# Patient Record
Sex: MALE | Race: WHITE | NOT HISPANIC OR LATINO | Employment: OTHER | URBAN - METROPOLITAN AREA
[De-identification: names, ages, dates, MRNs, and addresses within clinical notes are randomized per-mention and may not be internally consistent; named-entity substitution may affect disease eponyms.]

---

## 2018-11-07 ENCOUNTER — TELEPHONE (OUTPATIENT)
Dept: NEUROLOGY | Facility: CLINIC | Age: 63
End: 2018-11-07

## 2018-11-09 ENCOUNTER — TELEPHONE (OUTPATIENT)
Dept: NEUROLOGY | Facility: CLINIC | Age: 63
End: 2018-11-09

## 2018-12-03 ENCOUNTER — OFFICE VISIT (OUTPATIENT)
Dept: NEUROLOGY | Facility: CLINIC | Age: 63
End: 2018-12-03

## 2018-12-03 VITALS
HEIGHT: 71 IN | SYSTOLIC BLOOD PRESSURE: 158 MMHG | BODY MASS INDEX: 21.98 KG/M2 | WEIGHT: 157 LBS | HEART RATE: 97 BPM | DIASTOLIC BLOOD PRESSURE: 80 MMHG

## 2018-12-03 DIAGNOSIS — G44.40 MEDICATION OVERUSE HEADACHE: ICD-10-CM

## 2018-12-03 DIAGNOSIS — F11.20 CHRONIC NARCOTIC DEPENDENCE (HCC): ICD-10-CM

## 2018-12-03 DIAGNOSIS — G43.709 CHRONIC MIGRAINE WITHOUT AURA WITHOUT STATUS MIGRAINOSUS, NOT INTRACTABLE: ICD-10-CM

## 2018-12-03 DIAGNOSIS — R51.9 CHRONIC DAILY HEADACHE: Primary | ICD-10-CM

## 2018-12-03 DIAGNOSIS — R45.89 DEPRESSED MOOD: ICD-10-CM

## 2018-12-03 PROCEDURE — 99243 OFF/OP CNSLTJ NEW/EST LOW 30: CPT | Performed by: PSYCHIATRY & NEUROLOGY

## 2018-12-03 RX ORDER — HYDROCODONE BITARTRATE AND ACETAMINOPHEN 5; 325 MG/1; MG/1
1 TABLET ORAL EVERY 4 HOURS PRN
COMMUNITY
End: 2019-02-21 | Stop reason: SDUPTHER

## 2018-12-03 RX ORDER — HYDROCODONE BITARTRATE AND ACETAMINOPHEN 10; 325 MG/1; MG/1
1 TABLET ORAL EVERY 4 HOURS PRN
COMMUNITY
End: 2019-08-30 | Stop reason: HOSPADM

## 2018-12-03 RX ORDER — PROCHLORPERAZINE MALEATE 10 MG
10 TABLET ORAL 3 TIMES DAILY
Refills: 0 | COMMUNITY
Start: 2018-10-29 | End: 2019-04-24 | Stop reason: ALTCHOICE

## 2018-12-03 RX ORDER — FENTANYL 50 UG/H
1 PATCH TRANSDERMAL
COMMUNITY
End: 2019-08-30 | Stop reason: HOSPADM

## 2018-12-03 RX ORDER — VENLAFAXINE HYDROCHLORIDE 37.5 MG/1
CAPSULE, EXTENDED RELEASE ORAL
Qty: 60 CAPSULE | Refills: 3 | Status: SHIPPED | OUTPATIENT
Start: 2018-12-03 | End: 2019-04-16 | Stop reason: SDUPTHER

## 2018-12-03 NOTE — PATIENT INSTRUCTIONS
Would recommend considering cognitive behavioral therapy for chronic pain  If you could please try and send or bring in the MRI brain results, as well as recent lab results, and notes from your other doctor of what medications you have tried for pain prevention in the past (Lyrica etc)     Headache/migraine treatment:   Abortive medications (for immediate treatment of a headache): It is ok to take ibuprofen, acetaminophen or naproxen (Advil, Tylenol,  Aleve, Excedrin) if they help your headaches you should limit these to No more than 3 times a week to avoid medication overuse/rebound headaches  - continue to try in gradually wean off daily narcotics    Over the counter preventive supplements for headaches/migraines   (to take every day to help prevent headaches - not to take at the time of headache):  [x] Magnesium 500mg daily   [x] Riboflavin (Vitamin B2)  400mg daily (adults)   (FYI B2 may make your urine bright/neon yellow)  AND/OR  [x] Herbal medication: Petasites/[id number] mg (adults) daily  (When choosing your Butterbur online or in the store, beware that there are some poor preps containing pyrrolizidine alkaloids (PAs) that can be harmful to the liver  Therefore, do not use butterbur products that are not certified and labeled as hepatotoxic PA-free )    Prescription preventive medications for headaches/migraines   (to take every day to help prevent headaches - not to take at the time of headache):  [x] Venlafaxine 37 5 mg for 1 week and then increase to 75 mg daily   *Typically these types of medications take time untill you see the benefit, although some may see improvement in days, often it may take weeks, especially if the medication is being titrated up to a beneficial level  Please contact us if there are any concerns or questions regarding the medication  Sleep/headache prevention:   [x] Melatonin -  take 3 mg nightly for sleep   You should take this 1 hour prior to bedtime consistently every night for it to work  It works by gradually helping to adjust your sleep time over days to weeks, rather than immediately making you feel sleepy  Lifestyle Recommendations:  [x] SLEEP - Maintain a regular sleep schedule: Adults need at least 7-8 hours of uninterrupted a night  Maintain good sleep hygiene:  Going to bed and waking up at consistent times, avoiding excessive daytime naps, avoiding caffeinated beverages in the evening, avoid excessive stimulation in the evening and generally using bed primarily for sleeping  One hour before bedtime would recommend turning lights down lower, decreasing your activity (may read quietly, listen to music at a low volume)  When you get into bed, should eliminate all technology (no texting, emailing, playing with your phone, iPad or tablet in bed)  [x] HYDRATION - Maintain good hydration  Drink  2L of fluid a day (4 typical small water bottles)  [x] DIET - Maintain good nutrition  In particular don't skip meals and try and eat healthy balanced meals regularly  [x] TRIGGERS - Look for other triggers and avoid them: Limit caffeine to 1-2 cups a day or less  Avoid dietary triggers that you have noticed bring on your headaches (this could include aged cheese, peanuts, MSG, aspartame and nitrates)  [x] EXERCISE - physical exercise as we all know is good for you in many ways, and not only is good for your heart, but also is beneficial for your mental health, cognitive health and  chronic pain/headaches  I would encourage at the least 5 days of physical exercise weekly for at least 30 minutes  Education and Follow-up  [x] Please call with any questions or concerns     [x] Follow up with Dr Tai Rey
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
## 2018-12-03 NOTE — PROGRESS NOTES
Patient ID: Juan Ferrsi is a 66 y o  male  Assessment/Plan:    No problem-specific Assessment & Plan notes found for this encounter  {Assess/PlanSmartLinks:49413}       Subjective:    HPI    {[facility] HPI texts:93769}    {Common ambulatory SmartLinks:12318}         Objective:    Height 5' 11" (1 803 m), weight 71 2 kg (157 lb)  Physical Exam    Neurological Exam      ROS:    Review of Systems   Constitutional: Positive for fatigue  HENT: Negative  Eyes: Negative  Respiratory: Negative  Cardiovascular: Negative  Gastrointestinal: Negative  Endocrine: Negative  Genitourinary: Negative  Musculoskeletal: Positive for back pain, neck pain and neck stiffness  Allergic/Immunologic: Negative  Neurological: Positive for headaches  Hematological: Negative  Psychiatric/Behavioral: Positive for sleep disturbance
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
## 2019-02-21 ENCOUNTER — OFFICE VISIT (OUTPATIENT)
Dept: NEUROLOGY | Facility: CLINIC | Age: 64
End: 2019-02-21
Payer: COMMERCIAL

## 2019-02-21 VITALS
WEIGHT: 153 LBS | BODY MASS INDEX: 21.42 KG/M2 | DIASTOLIC BLOOD PRESSURE: 82 MMHG | SYSTOLIC BLOOD PRESSURE: 155 MMHG | HEIGHT: 71 IN | HEART RATE: 115 BPM

## 2019-02-21 DIAGNOSIS — M54.2 CERVICALGIA: ICD-10-CM

## 2019-02-21 DIAGNOSIS — R45.89 DEPRESSED MOOD: ICD-10-CM

## 2019-02-21 DIAGNOSIS — G43.709 CHRONIC MIGRAINE WITHOUT AURA WITHOUT STATUS MIGRAINOSUS, NOT INTRACTABLE: Primary | ICD-10-CM

## 2019-02-21 DIAGNOSIS — E55.9 VITAMIN D DEFICIENCY: ICD-10-CM

## 2019-02-21 DIAGNOSIS — F51.01 PRIMARY INSOMNIA: ICD-10-CM

## 2019-02-21 DIAGNOSIS — J32.0 CHRONIC MAXILLARY SINUSITIS: ICD-10-CM

## 2019-02-21 DIAGNOSIS — R51.9 CHRONIC DAILY HEADACHE: ICD-10-CM

## 2019-02-21 PROBLEM — G47.00 INSOMNIA: Status: ACTIVE | Noted: 2019-02-21

## 2019-02-21 PROBLEM — J32.9 SINUSITIS: Status: ACTIVE | Noted: 2019-02-21

## 2019-02-21 PROCEDURE — 99215 OFFICE O/P EST HI 40 MIN: CPT | Performed by: PSYCHIATRY & NEUROLOGY

## 2019-02-21 RX ORDER — LANOLIN ALCOHOL/MO/W.PET/CERES
3 CREAM (GRAM) TOPICAL
COMMUNITY

## 2019-02-21 RX ORDER — VERAPAMIL HYDROCHLORIDE 40 MG/1
TABLET ORAL
Qty: 60 TABLET | Refills: 3 | Status: SHIPPED | OUTPATIENT
Start: 2019-02-21 | End: 2019-04-24 | Stop reason: SINTOL

## 2019-02-21 RX ORDER — UREA 10 %
1000 LOTION (ML) TOPICAL 2 TIMES DAILY
COMMUNITY
End: 2020-04-08 | Stop reason: ALTCHOICE

## 2019-02-21 RX ORDER — BACLOFEN 10 MG/1
TABLET ORAL
Qty: 30 TABLET | Refills: 6 | Status: SHIPPED | OUTPATIENT
Start: 2019-02-21 | End: 2019-06-06 | Stop reason: ALTCHOICE

## 2019-02-21 NOTE — PATIENT INSTRUCTIONS
Diagnosis: Suspect patient has  Chronic migraine headaches    Preventive therapy for headaches:   -Over-the-counter supplements: to decrease intensity and frequency of migraines  - Magnesium Oxide 400mg twice a day  If any diarrhea or upset stomach, decrease dose  as tolerated  -  vitamin B2 200 mg twice a day  This supplement will change the color of the urine to fluorescent yellow no matter how hydrated, which is normal    - Verapamil 40mg tiwce a day  - Botox for chronic daily headaches  - Baclofen 10mg at bedtime  - Venlafaxine 35 7mg at bedtime daily  -Abortive therapy for headaches:   - goal is to continue to come off of his other medication    Headache management instructions  - When patient has a moderate to severe headache, they should seek rest, initiate relaxation and apply cold compresses to the head  - Maintain regular sleep schedule  Adults need at least 7-8 hours of uninterrupted a night  - Limit over the counter medications such as Tylenol, Ibuprofen, Aleve, Excedrin  (No more than 3 times a week)  - Maintain headache diary  We discussed an LIZBET for a smart phone is "Migraine gopal"  - Limit caffeine to 1-2 cups 8 to 16 oz a day or less  - Avoid dietary trigger  (aged cheese, peanuts, MSG, aspartame and nitrates)  - Patient is to have regular frequent meals to prevent headache onset  - Please drink at least 64 ounces of water a day to help remain hydrated  Please call with any questions or concerns   Office number is [phone]

## 2019-02-28 ENCOUNTER — TELEPHONE (OUTPATIENT)
Dept: NEUROLOGY | Facility: CLINIC | Age: 64
End: 2019-02-28

## 2019-02-28 NOTE — TELEPHONE ENCOUNTER
Patient has Tam  Still awaiting authorization  Will call [facility] back as we will not be filling with them

## 2019-03-01 NOTE — TELEPHONE ENCOUNTER
[address] [place] [address] Early,     Please schedule patient with Dr Celestine Jaramillo for a new start botox  Specialty pharmacy       Please advise     Thank you,   Ainsley : )

## 2019-03-01 NOTE — TELEPHONE ENCOUNTER
lore from [facility] called regarding botox    i spoke to [facility] she states that we rhett be using [facility] but are still waiting for auth  lore made aware

## 2019-03-04 NOTE — TELEPHONE ENCOUNTER
Referral Notes   Number of Notes: 1   Type Date User Summary Attachment   General 03/01/2019  3:14 PM Marlen Teran care coordination -   Note    Auth # [id number] valid till 05/26/2019     Please use [facility] specialty pharmacy      Thank you

## 2019-03-04 NOTE — TELEPHONE ENCOUNTER
Please call [facility] and order Botox  New start [facility] 3/27/19 [address] [address] [place]   Thanks

## 2019-03-04 NOTE — TELEPHONE ENCOUNTER
Called and spoke with rep Ernesto Sanchez who transferred me to pharmacist Aldo so I could give a verbal  I gave a verbal of Botox 200 units SDV quantity of one with 4 refills  I will call back in a couple of days to check on the status of the order

## 2019-03-04 NOTE — TELEPHONE ENCOUNTER
Alexa Hare from [facility] states they needs Botox Rx  States this is their 3rd and final attempt to reach our office re Rx  I made her aware that MA working on Rx

## 2019-03-04 NOTE — TELEPHONE ENCOUNTER
Called and spoke with rep Bismark Gipson and put a NBD of 03/14/19 in their system and the address to the [facility]

## 2019-03-08 NOTE — TELEPHONE ENCOUNTER
Called back and spoke with rep [employer] [place] [employer] who stated notes on account state order rejected requiring P A  Asked if I could be transferred to insurance as I already have P A information  Transferred to Andres Bella in insurance who updated account with P A information  I also requested to be transferred to pharmacists to update prescription as we ordered Botox 200 unit SDV quantity of 1 and insurance auth is valid for Botox 100 unit SDV quantity of 2  Transferred call to pharmacists Will and provided script update  Check back on Monday

## 2019-03-08 NOTE — TELEPHONE ENCOUNTER
Faxed request for P  A through [facility] initiated from [facility] faxed on 3/6/19 even though P A already received and approved  Called and spoke with rep Yamileth Katz to provide P A information already on file for this patient  Call placed on hold for 45 minutes and then call disconnected

## 2019-03-11 NOTE — TELEPHONE ENCOUNTER
Called and spoke with rep Lawson and I set up delivery for 03/13/19 for the [facility] with a signature required  I will await shipment

## 2019-03-11 NOTE — TELEPHONE ENCOUNTER
[facility] calling to schedule botox delivery  Izzy Rivera not avail, currently w/ pt per Josue Gonsalez pls call [facility] at [phone]

## 2019-03-13 NOTE — TELEPHONE ENCOUNTER
Botox arrived in the [facility] location it has been logged and stored in the fridge       (2) 100 units SDV  NDC# 4688-6871-64  EXP- 08/2021

## 2019-03-20 ENCOUNTER — OFFICE VISIT (OUTPATIENT)
Dept: SLEEP CENTER | Facility: CLINIC | Age: 64
End: 2019-03-20
Payer: COMMERCIAL

## 2019-03-20 VITALS
BODY MASS INDEX: 21.81 KG/M2 | HEART RATE: 109 BPM | WEIGHT: 155.8 LBS | DIASTOLIC BLOOD PRESSURE: 90 MMHG | SYSTOLIC BLOOD PRESSURE: 138 MMHG | HEIGHT: 71 IN

## 2019-03-20 DIAGNOSIS — F51.01 PRIMARY INSOMNIA: Primary | ICD-10-CM

## 2019-03-20 DIAGNOSIS — G47.8 NON-RESTORATIVE SLEEP: ICD-10-CM

## 2019-03-20 DIAGNOSIS — R06.81 WITNESSED EPISODE OF APNEA: ICD-10-CM

## 2019-03-20 DIAGNOSIS — F11.90 CHRONIC, CONTINUOUS USE OF OPIOIDS: ICD-10-CM

## 2019-03-20 PROCEDURE — 99214 OFFICE O/P EST MOD 30 MIN: CPT | Performed by: NURSE PRACTITIONER
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
## 2019-03-22 NOTE — PROGRESS NOTES
Chemodenervation  Date/Time: 3/27/2019 9:08 AM  Performed by: Giulia Noble MD  Authorized by: Giulia Noble MD     Pre-procedure details:     Prepped With: Alcohol    Anesthesia (see MAR for exact dosages): Anesthesia method:  None  Procedure details:     Position:  Upright  Botox:     Botox Type:  Type A    Brand:  Botox    mL's of Botulinum Toxin:  155    Final Concentration per CC:  200 units    Needle Gauge:  30 G 2 5 inch    Medication Administration:  100 Units onabotulinumtoxin A 100 units  Procedures:     Botox Procedures: chronic headache      Indications: migraines    Injection Location:     Head / Face:  L superior cervical paraspinal, R superior cervical paraspinal, L , R , L frontalis, R frontalis, L medial occipitalis, R medial occipitalis, procerus, R temporalis, L temporalis, L superior trapezius and R superior trapezius    L  injection amount:  5 unit(s)    R  injection amount:  5 unit(s)    L lateral frontalis:  5 unit(s)    R lateral frontalis:  5 unit(s)    L medial frontalis:  5 unit(s)    R medial frontalis:  5 unit(s)    L temporalis injection amount:  20 unit(s)    R temporalis injection amount:  20 unit(s)    Procerus injection amount:  5 unit(s)    L medial occipitalis injection amount:  15 unit(s)    R medial occipitalis injection amount:  15 unit(s)    L superior cervical paraspinal injection amount:  10 unit(s)    R superior cervical paraspinal injection amount:  10 unit(s)    L superior trapezius injection amount:  15 unit(s)    R superior trapezius injection amount:  15 unit(s)  Total Units:     Total units used:  155    Total units discarded:  45  Post-procedure details:     Chemodenervation:  Chronic migraine    Facial Nerve Location[de-identified]  Bilateral facial nerve    Patient tolerance of procedure:   Tolerated well, no immediate complications

## 2019-03-27 ENCOUNTER — PROCEDURE VISIT (OUTPATIENT)
Dept: NEUROLOGY | Facility: CLINIC | Age: 64
End: 2019-03-27
Payer: COMMERCIAL

## 2019-03-27 ENCOUNTER — TRANSCRIBE ORDERS (OUTPATIENT)
Dept: NEUROLOGY | Facility: CLINIC | Age: 64
End: 2019-03-27

## 2019-03-27 VITALS — SYSTOLIC BLOOD PRESSURE: 130 MMHG | DIASTOLIC BLOOD PRESSURE: 96 MMHG | TEMPERATURE: 98.3 F | HEART RATE: 113 BPM

## 2019-03-27 DIAGNOSIS — M54.2 CERVICALGIA: ICD-10-CM

## 2019-03-27 DIAGNOSIS — G43.709 CHRONIC MIGRAINE WITHOUT AURA WITHOUT STATUS MIGRAINOSUS, NOT INTRACTABLE: Primary | ICD-10-CM

## 2019-03-27 DIAGNOSIS — R51.9 HEADACHE: ICD-10-CM

## 2019-03-27 PROCEDURE — 64615 CHEMODENERV MUSC MIGRAINE: CPT | Performed by: PSYCHIATRY & NEUROLOGY

## 2019-04-04 ENCOUNTER — TELEPHONE (OUTPATIENT)
Dept: NEUROLOGY | Facility: CLINIC | Age: 64
End: 2019-04-04

## 2019-04-04 DIAGNOSIS — E55.9 VITAMIN D DEFICIENCY: Primary | ICD-10-CM

## 2019-04-04 LAB
25(OH)D3+25(OH)D2 SERPL-MCNC: 15.3 NG/ML (ref 30–100)
ALBUMIN SERPL-MCNC: 4.6 G/DL (ref 3.6–4.8)
ALBUMIN/GLOB SERPL: 1.9 {RATIO} (ref 1.2–2.2)
ALP SERPL-CCNC: 50 IU/L (ref 39–117)
ALT SERPL-CCNC: 20 IU/L (ref 0–44)
AST SERPL-CCNC: 21 IU/L (ref 0–40)
BASOPHILS # BLD AUTO: 0 X10E3/UL (ref 0–0.2)
BASOPHILS NFR BLD AUTO: 0 %
BILIRUB SERPL-MCNC: 0.4 MG/DL (ref 0–1.2)
BUN SERPL-MCNC: 16 MG/DL (ref 8–27)
BUN/CREAT SERPL: 15 (ref 10–24)
CALCIUM SERPL-MCNC: 10 MG/DL (ref 8.6–10.2)
CHLORIDE SERPL-SCNC: 98 MMOL/L (ref 96–106)
CO2 SERPL-SCNC: 28 MMOL/L (ref 20–29)
CREAT SERPL-MCNC: 1.08 MG/DL (ref 0.76–1.27)
EOSINOPHIL # BLD AUTO: 0 X10E3/UL (ref 0–0.4)
EOSINOPHIL NFR BLD AUTO: 0 %
ERYTHROCYTE [DISTWIDTH] IN BLOOD BY AUTOMATED COUNT: 13 % (ref 12.3–15.4)
GLOBULIN SER-MCNC: 2.4 G/DL (ref 1.5–4.5)
GLUCOSE SERPL-MCNC: 84 MG/DL (ref 65–99)
HCT VFR BLD AUTO: 44.9 % (ref 37.5–51)
HGB BLD-MCNC: 15.3 G/DL (ref 13–17.7)
IMM GRANULOCYTES # BLD: 0.1 X10E3/UL (ref 0–0.1)
IMM GRANULOCYTES NFR BLD: 1 %
LABCORP COMMENT: NORMAL
LYMPHOCYTES # BLD AUTO: 4 X10E3/UL (ref 0.7–3.1)
LYMPHOCYTES NFR BLD AUTO: 41 %
MCH RBC QN AUTO: 33.1 PG (ref 26.6–33)
MCHC RBC AUTO-ENTMCNC: 34.1 G/DL (ref 31.5–35.7)
MCV RBC AUTO: 97 FL (ref 79–97)
MONOCYTES # BLD AUTO: 0.6 X10E3/UL (ref 0.1–0.9)
MONOCYTES NFR BLD AUTO: 6 %
NEUTROPHILS # BLD AUTO: 5.1 X10E3/UL (ref 1.4–7)
NEUTROPHILS NFR BLD AUTO: 52 %
PLATELET # BLD AUTO: 260 X10E3/UL (ref 150–379)
POTASSIUM SERPL-SCNC: 4.4 MMOL/L (ref 3.5–5.2)
PROT SERPL-MCNC: 7 G/DL (ref 6–8.5)
RBC # BLD AUTO: 4.62 X10E6/UL (ref 4.14–5.8)
SL AMB EGFR AFRICAN AMERICAN: 85 ML/MIN/1.73
SL AMB EGFR NON AFRICAN AMERICAN: 74 ML/MIN/1.73
SODIUM SERPL-SCNC: 141 MMOL/L (ref 134–144)
TSH SERPL DL<=0.005 MIU/L-ACNC: 2.11 UIU/ML (ref 0.45–4.5)
VIT B12 SERPL-MCNC: 439 PG/ML (ref 232–1245)
WBC # BLD AUTO: 9.9 X10E3/UL (ref 3.4–10.8)

## 2019-04-04 RX ORDER — ERGOCALCIFEROL 1.25 MG/1
50000 CAPSULE ORAL WEEKLY
Qty: 12 CAPSULE | Refills: 0 | Status: SHIPPED | OUTPATIENT
Start: 2019-04-04 | End: 2019-08-15 | Stop reason: ALTCHOICE

## 2019-04-08 ENCOUNTER — HOSPITAL ENCOUNTER (OUTPATIENT)
Dept: SLEEP CENTER | Facility: CLINIC | Age: 64
Discharge: HOME | End: 2019-04-08
Payer: COMMERCIAL

## 2019-04-08 DIAGNOSIS — F11.90 CHRONIC, CONTINUOUS USE OF OPIOIDS: ICD-10-CM

## 2019-04-08 DIAGNOSIS — G47.8 NON-RESTORATIVE SLEEP: ICD-10-CM

## 2019-04-08 DIAGNOSIS — G47.39 MIXED SLEEP APNEA: ICD-10-CM

## 2019-04-08 DIAGNOSIS — F51.01 PRIMARY INSOMNIA: ICD-10-CM

## 2019-04-08 DIAGNOSIS — R06.81 WITNESSED EPISODE OF APNEA: ICD-10-CM

## 2019-04-08 PROCEDURE — 95810 POLYSOM 6/> YRS 4/> PARAM: CPT

## 2019-04-08 PROCEDURE — 95810 POLYSOM 6/> YRS 4/> PARAM: CPT | Performed by: PSYCHIATRY & NEUROLOGY

## 2019-04-09 ENCOUNTER — TELEPHONE (OUTPATIENT)
Dept: NEUROLOGY | Facility: CLINIC | Age: 64
End: 2019-04-09

## 2019-04-11 ENCOUNTER — TELEPHONE (OUTPATIENT)
Dept: SLEEP CENTER | Facility: CLINIC | Age: 64
End: 2019-04-11

## 2019-04-16 DIAGNOSIS — R51.9 CHRONIC DAILY HEADACHE: ICD-10-CM

## 2019-04-16 DIAGNOSIS — R45.89 DEPRESSED MOOD: ICD-10-CM

## 2019-04-16 DIAGNOSIS — G43.709 CHRONIC MIGRAINE WITHOUT AURA WITHOUT STATUS MIGRAINOSUS, NOT INTRACTABLE: ICD-10-CM

## 2019-04-16 DIAGNOSIS — M54.2 CERVICALGIA: ICD-10-CM

## 2019-04-16 RX ORDER — BACLOFEN 10 MG/1
TABLET ORAL
Qty: 30 TABLET | Refills: 0 | Status: CANCELLED | OUTPATIENT
Start: 2019-04-16

## 2019-04-16 RX ORDER — VENLAFAXINE HYDROCHLORIDE 37.5 MG/1
CAPSULE, EXTENDED RELEASE ORAL
Qty: 60 CAPSULE | Refills: 2 | Status: SHIPPED | OUTPATIENT
Start: 2019-04-16 | End: 2019-04-24 | Stop reason: SDUPTHER

## 2019-04-24 ENCOUNTER — TELEPHONE (OUTPATIENT)
Dept: NEUROLOGY | Facility: CLINIC | Age: 64
End: 2019-04-24

## 2019-04-24 ENCOUNTER — OFFICE VISIT (OUTPATIENT)
Dept: NEUROLOGY | Facility: CLINIC | Age: 64
End: 2019-04-24
Payer: COMMERCIAL

## 2019-04-24 VITALS
HEIGHT: 71 IN | HEART RATE: 109 BPM | BODY MASS INDEX: 22.01 KG/M2 | SYSTOLIC BLOOD PRESSURE: 168 MMHG | WEIGHT: 157.2 LBS | DIASTOLIC BLOOD PRESSURE: 96 MMHG

## 2019-04-24 DIAGNOSIS — R51.9 CHRONIC DAILY HEADACHE: ICD-10-CM

## 2019-04-24 DIAGNOSIS — R51.9 HEADACHE: ICD-10-CM

## 2019-04-24 DIAGNOSIS — G43.709 CHRONIC MIGRAINE WITHOUT AURA WITHOUT STATUS MIGRAINOSUS, NOT INTRACTABLE: ICD-10-CM

## 2019-04-24 DIAGNOSIS — R45.89 DEPRESSED MOOD: ICD-10-CM

## 2019-04-24 DIAGNOSIS — M54.2 CERVICALGIA: ICD-10-CM

## 2019-04-24 PROCEDURE — 99214 OFFICE O/P EST MOD 30 MIN: CPT | Performed by: PSYCHIATRY & NEUROLOGY

## 2019-04-24 RX ORDER — GABAPENTIN 300 MG/1
300 CAPSULE ORAL 3 TIMES DAILY
Qty: 270 CAPSULE | Refills: 3 | Status: SHIPPED | OUTPATIENT
Start: 2019-04-24 | End: 2019-07-29 | Stop reason: SDUPTHER

## 2019-04-24 RX ORDER — BACLOFEN 10 MG/1
10 TABLET ORAL 3 TIMES DAILY
Qty: 270 TABLET | Refills: 1 | Status: SHIPPED | OUTPATIENT
Start: 2019-04-24 | End: 2019-07-29 | Stop reason: SDUPTHER

## 2019-04-24 RX ORDER — VENLAFAXINE HYDROCHLORIDE 37.5 MG/1
CAPSULE, EXTENDED RELEASE ORAL
Qty: 270 CAPSULE | Refills: 2 | Status: SHIPPED | OUTPATIENT
Start: 2019-04-24 | End: 2019-06-06 | Stop reason: SDUPTHER

## 2019-06-06 ENCOUNTER — TELEPHONE (OUTPATIENT)
Dept: NEUROLOGY | Facility: CLINIC | Age: 64
End: 2019-06-06

## 2019-06-06 ENCOUNTER — OFFICE VISIT (OUTPATIENT)
Dept: NEUROLOGY | Facility: CLINIC | Age: 64
End: 2019-06-06
Payer: COMMERCIAL

## 2019-06-06 VITALS
BODY MASS INDEX: 22.43 KG/M2 | HEART RATE: 96 BPM | WEIGHT: 160.2 LBS | DIASTOLIC BLOOD PRESSURE: 82 MMHG | HEIGHT: 71 IN | SYSTOLIC BLOOD PRESSURE: 126 MMHG

## 2019-06-06 DIAGNOSIS — G43.709 CHRONIC MIGRAINE WITHOUT AURA WITHOUT STATUS MIGRAINOSUS, NOT INTRACTABLE: ICD-10-CM

## 2019-06-06 DIAGNOSIS — R45.89 DEPRESSED MOOD: ICD-10-CM

## 2019-06-06 DIAGNOSIS — R51.9 CHRONIC DAILY HEADACHE: ICD-10-CM

## 2019-06-06 PROCEDURE — 99214 OFFICE O/P EST MOD 30 MIN: CPT | Performed by: PSYCHIATRY & NEUROLOGY

## 2019-06-06 RX ORDER — VENLAFAXINE HYDROCHLORIDE 37.5 MG/1
37.5 CAPSULE, EXTENDED RELEASE ORAL DAILY
Qty: 90 CAPSULE | Refills: 2 | Status: SHIPPED | OUTPATIENT
Start: 2019-06-06 | End: 2019-07-29 | Stop reason: SDUPTHER

## 2019-06-06 RX ORDER — VENLAFAXINE HYDROCHLORIDE 37.5 MG/1
CAPSULE, EXTENDED RELEASE ORAL
Qty: 270 CAPSULE | Refills: 2 | Status: SHIPPED | OUTPATIENT
Start: 2019-06-06 | End: 2019-06-06 | Stop reason: SDUPTHER

## 2019-06-06 RX ORDER — DIVALPROEX SODIUM 250 MG/1
TABLET, DELAYED RELEASE ORAL
Qty: 120 TABLET | Refills: 3 | Status: SHIPPED | OUTPATIENT
Start: 2019-06-06 | End: 2019-07-29 | Stop reason: SDUPTHER

## 2019-06-06 RX ORDER — VENLAFAXINE HYDROCHLORIDE 75 MG/1
75 CAPSULE, EXTENDED RELEASE ORAL DAILY
Qty: 90 CAPSULE | Refills: 2 | Status: SHIPPED | OUTPATIENT
Start: 2019-06-06 | End: 2019-07-29 | Stop reason: SDUPTHER

## 2019-06-20 ENCOUNTER — TELEPHONE (OUTPATIENT)
Dept: NEUROLOGY | Facility: CLINIC | Age: 64
End: 2019-06-20

## 2019-06-26 ENCOUNTER — PROCEDURE VISIT (OUTPATIENT)
Dept: NEUROLOGY | Facility: CLINIC | Age: 64
End: 2019-06-26
Payer: COMMERCIAL

## 2019-06-26 VITALS — DIASTOLIC BLOOD PRESSURE: 83 MMHG | SYSTOLIC BLOOD PRESSURE: 136 MMHG | TEMPERATURE: 98.1 F | HEART RATE: 94 BPM

## 2019-06-26 DIAGNOSIS — G43.709 CHRONIC MIGRAINE WITHOUT AURA WITHOUT STATUS MIGRAINOSUS, NOT INTRACTABLE: Primary | ICD-10-CM

## 2019-06-26 PROCEDURE — 64615 CHEMODENERV MUSC MIGRAINE: CPT | Performed by: PSYCHIATRY & NEUROLOGY

## 2019-07-29 DIAGNOSIS — R45.89 DEPRESSED MOOD: ICD-10-CM

## 2019-07-29 DIAGNOSIS — R51.9 CHRONIC DAILY HEADACHE: ICD-10-CM

## 2019-07-29 DIAGNOSIS — G43.709 CHRONIC MIGRAINE WITHOUT AURA WITHOUT STATUS MIGRAINOSUS, NOT INTRACTABLE: ICD-10-CM

## 2019-07-29 DIAGNOSIS — M54.2 CERVICALGIA: ICD-10-CM

## 2019-07-29 RX ORDER — DIVALPROEX SODIUM 250 MG/1
500 TABLET, DELAYED RELEASE ORAL EVERY 12 HOURS SCHEDULED
Qty: 120 TABLET | Refills: 3 | Status: SHIPPED | OUTPATIENT
Start: 2019-07-29 | End: 2019-08-30 | Stop reason: HOSPADM

## 2019-07-29 RX ORDER — VENLAFAXINE HYDROCHLORIDE 37.5 MG/1
37.5 CAPSULE, EXTENDED RELEASE ORAL DAILY
Qty: 90 CAPSULE | Refills: 3 | Status: SHIPPED | OUTPATIENT
Start: 2019-07-29 | End: 2019-08-30 | Stop reason: HOSPADM

## 2019-07-29 RX ORDER — GABAPENTIN 300 MG/1
300 CAPSULE ORAL 3 TIMES DAILY
Qty: 270 CAPSULE | Refills: 0 | Status: SHIPPED | OUTPATIENT
Start: 2019-07-29 | End: 2019-09-05 | Stop reason: SDUPTHER

## 2019-07-29 RX ORDER — VENLAFAXINE HYDROCHLORIDE 75 MG/1
75 CAPSULE, EXTENDED RELEASE ORAL DAILY
Qty: 90 CAPSULE | Refills: 3 | Status: SHIPPED | OUTPATIENT
Start: 2019-07-29 | End: 2019-10-31 | Stop reason: HOSPADM

## 2019-07-29 RX ORDER — BACLOFEN 10 MG/1
10 TABLET ORAL 3 TIMES DAILY
Qty: 270 TABLET | Refills: 0 | Status: SHIPPED | OUTPATIENT
Start: 2019-07-29 | End: 2019-09-23 | Stop reason: ALTCHOICE

## 2019-08-15 ENCOUNTER — OFFICE VISIT (OUTPATIENT)
Dept: NEUROLOGY | Facility: CLINIC | Age: 64
End: 2019-08-15
Payer: COMMERCIAL

## 2019-08-15 VITALS
HEART RATE: 118 BPM | BODY MASS INDEX: 22.68 KG/M2 | SYSTOLIC BLOOD PRESSURE: 145 MMHG | WEIGHT: 162 LBS | HEIGHT: 71 IN | DIASTOLIC BLOOD PRESSURE: 84 MMHG

## 2019-08-15 DIAGNOSIS — G43.709 CHRONIC MIGRAINE WITHOUT AURA WITHOUT STATUS MIGRAINOSUS, NOT INTRACTABLE: Primary | ICD-10-CM

## 2019-08-15 PROCEDURE — 99214 OFFICE O/P EST MOD 30 MIN: CPT | Performed by: PSYCHIATRY & NEUROLOGY

## 2019-08-15 RX ORDER — OLANZAPINE 5 MG/1
TABLET ORAL
Qty: 30 TABLET | Refills: 1 | Status: SHIPPED | OUTPATIENT
Start: 2019-08-15 | End: 2019-09-20 | Stop reason: ALTCHOICE
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
## 2019-08-15 NOTE — PATIENT INSTRUCTIONS
Preventive therapy for headaches:   -Over-the-counter supplements: to decrease intensity and frequency of migraines  - Magnesium Oxide 400mg twice a day   If any diarrhea or upset stomach, decrease dose  as tolerated  -  Vitamin B2 200 mg twice a day  This supplement will change the color of the urine to fluorescent yellow no matter how hydrated, which is normal    - Metoprolol 25 mg half a tab twice a day and do this for one month and then go up to one tab twice a day  - Botox for chronic daily headaches  - Baclofen 10mg three times a day  - Venlafaxine 35 7mg three tabs at bedtime- needs to go down to two pills for 10 days then one pill and stay there until seen in two months    - Gabapentin 900 mg at bedtime after that  - Depakote 250 mg twice a day for 7 days then 500 mg twice a day after that  - will have stop taking the Depakote starting tonight    - Start taking olanzapine 5 mg at bedtime daily    -Abortive therapy for headaches:   - goal is to continue to come off of his other medication   Will keep him at Fentanyl 50 mcg for now and consider after his second botox       Call him pcp as well today

## 2019-08-23 ENCOUNTER — TELEPHONE (OUTPATIENT)
Dept: NEUROLOGY | Facility: CLINIC | Age: 64
End: 2019-08-23

## 2019-08-23 NOTE — TELEPHONE ENCOUNTER
Called patients wife and she was made aware of the process  She is requesting that we let the hospital know that they live an hour away

## 2019-08-23 NOTE — TELEPHONE ENCOUNTER
For ketamine infusions, we call the hospital on Monday to setup treatment plan  Hospital will call the patient to let them know when to arrive and 2 which floor and bed

## 2019-08-23 NOTE — TELEPHONE ENCOUNTER
Patients wife called as she states she was told by you patient would be admitted to the hospital on Monday for ketamine infusions  I am not seeing anything documented in the chart regarding this  Please advise any information you might have      Hiren Pickens - [phone]

## 2019-08-26 ENCOUNTER — HOSPITAL ENCOUNTER (INPATIENT)
Facility: HOSPITAL | Age: 64
LOS: 4 days | Discharge: HOME | DRG: 054 | End: 2019-08-30
Attending: INTERNAL MEDICINE | Admitting: INTERNAL MEDICINE
Payer: COMMERCIAL

## 2019-08-26 DIAGNOSIS — G43.709 CHRONIC MIGRAINE WITHOUT AURA WITHOUT STATUS MIGRAINOSUS, NOT INTRACTABLE: Primary | ICD-10-CM

## 2019-08-26 PROCEDURE — 99222 1ST HOSP IP/OBS MODERATE 55: CPT | Performed by: INTERNAL MEDICINE

## 2019-08-26 RX ORDER — GABAPENTIN 300 MG/1
300 CAPSULE ORAL 3 TIMES DAILY
Status: DISCONTINUED | OUTPATIENT
Start: 2019-08-26 | End: 2019-08-30 | Stop reason: HOSPADM

## 2019-08-26 RX ORDER — LORATADINE 10 MG/1
10 TABLET ORAL DAILY
Status: DISCONTINUED | OUTPATIENT
Start: 2019-08-27 | End: 2019-08-30 | Stop reason: HOSPADM

## 2019-08-26 RX ORDER — VENLAFAXINE HYDROCHLORIDE 37.5 MG/1
37.5 CAPSULE, EXTENDED RELEASE ORAL DAILY
Status: DISCONTINUED | OUTPATIENT
Start: 2019-08-27 | End: 2019-08-26

## 2019-08-26 RX ORDER — OLANZAPINE 5 MG/1
5 TABLET ORAL
Status: DISCONTINUED | OUTPATIENT
Start: 2019-08-26 | End: 2019-08-30 | Stop reason: HOSPADM

## 2019-08-26 RX ORDER — UREA 10 %
1000 LOTION (ML) TOPICAL 2 TIMES DAILY
Status: DISCONTINUED | OUTPATIENT
Start: 2019-08-26 | End: 2019-08-30 | Stop reason: HOSPADM

## 2019-08-26 RX ORDER — FENTANYL 25 UG/H
1 PATCH TRANSDERMAL
Status: DISCONTINUED | OUTPATIENT
Start: 2019-08-26 | End: 2019-08-27

## 2019-08-26 RX ORDER — BACLOFEN 10 MG/1
10 TABLET ORAL 3 TIMES DAILY
Status: DISCONTINUED | OUTPATIENT
Start: 2019-08-26 | End: 2019-08-30 | Stop reason: HOSPADM

## 2019-08-26 RX ORDER — ONDANSETRON 2 MG/ML
4 INJECTION INTRAMUSCULAR; INTRAVENOUS EVERY 6 HOURS PRN
Status: DISCONTINUED | OUTPATIENT
Start: 2019-08-26 | End: 2019-08-30 | Stop reason: HOSPADM

## 2019-08-26 RX ORDER — HYDROCODONE BITARTRATE AND ACETAMINOPHEN 5; 325 MG/1; MG/1
1 TABLET ORAL EVERY 4 HOURS PRN
Status: DISCONTINUED | OUTPATIENT
Start: 2019-08-26 | End: 2019-08-27

## 2019-08-26 RX ORDER — DIVALPROEX SODIUM 500 MG/1
500 TABLET, DELAYED RELEASE ORAL EVERY 12 HOURS SCHEDULED
Status: DISCONTINUED | OUTPATIENT
Start: 2019-08-26 | End: 2019-08-30 | Stop reason: HOSPADM

## 2019-08-26 RX ORDER — DIPHENHYDRAMINE HCL 25 MG
25 TABLET ORAL EVERY 6 HOURS PRN
Status: DISCONTINUED | OUTPATIENT
Start: 2019-08-26 | End: 2019-08-30 | Stop reason: HOSPADM

## 2019-08-26 RX ORDER — LANOLIN ALCOHOL/MO/W.PET/CERES
3 CREAM (GRAM) TOPICAL
Status: DISCONTINUED | OUTPATIENT
Start: 2019-08-26 | End: 2019-08-30 | Stop reason: HOSPADM

## 2019-08-26 RX ORDER — VENLAFAXINE HYDROCHLORIDE 37.5 MG/1
112.5 CAPSULE, EXTENDED RELEASE ORAL DAILY
Status: DISCONTINUED | OUTPATIENT
Start: 2019-08-27 | End: 2019-08-27

## 2019-08-26 RX ADMIN — DIVALPROEX SODIUM 500 MG: 500 TABLET, DELAYED RELEASE ORAL at 21:05

## 2019-08-26 RX ADMIN — FENTANYL 1 PATCH: 25 PATCH, EXTENDED RELEASE TRANSDERMAL at 17:32

## 2019-08-26 RX ADMIN — OLANZAPINE 5 MG: 5 TABLET, FILM COATED ORAL at 21:05

## 2019-08-26 RX ADMIN — BACLOFEN 10 MG: 10 TABLET ORAL at 21:05

## 2019-08-26 RX ADMIN — GABAPENTIN 300 MG: 300 CAPSULE ORAL at 17:31

## 2019-08-26 RX ADMIN — MELATONIN 3 MG: 3 TAB ORAL at 21:05

## 2019-08-26 RX ADMIN — BACLOFEN 10 MG: 10 TABLET ORAL at 17:31

## 2019-08-26 RX ADMIN — GABAPENTIN 300 MG: 300 CAPSULE ORAL at 21:05

## 2019-08-26 RX ADMIN — Medication 1000 MG: at 20:00
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
## 2019-08-26 NOTE — PLAN OF CARE
Problem: PAIN - ADULT  Goal: Verbalizes/displays adequate comfort level or baseline comfort level  Description  Interventions:  - Encourage patient to monitor pain and request assistance  - Assess pain using appropriate pain scale  - Administer analgesics based on type and severity of pain and evaluate response  - Implement non-pharmacological measures as appropriate and evaluate response  - Consider cultural and social influences on pain and pain management  - Notify physician/advanced practitioner if interventions unsuccessful or patient reports new pain  Outcome: Progressing     Problem: SAFETY ADULT  Goal: Patient will remain free of falls  Description  INTERVENTIONS:  - Assess patient frequently for physical needs  -  Identify cognitive and physical deficits and behaviors that affect risk of falls    -  [place] fall precautions as indicated by assessment   - Educate patient/family on patient safety including physical limitations  - Instruct patient to call for assistance with activity based on assessment  - Modify environment to reduce risk of injury  - Consider OT/PT consult to assist with strengthening/mobility  Outcome: Progressing  Goal: Maintain or return to baseline ADL function  Description  INTERVENTIONS:  -  Assess patient's ability to carry out ADLs; assess patient's baseline for ADL function and identify physical deficits which impact ability to perform ADLs (bathing, care of mouth/teeth, toileting, grooming, dressing, etc )  - Assess/evaluate cause of self-care deficits   - Assess range of motion  - Assess patient's mobility; develop plan if impaired  - Assess patient's need for assistive devices and provide as appropriate  - Encourage maximum independence but intervene and supervise when necessary  - Involve family in performance of ADLs  - Assess for home care needs following discharge   - Consider OT consult to assist with ADL evaluation and planning for discharge  - Provide patient education as appropriate  Outcome: Progressing  Goal: Maintain or return mobility status to optimal level  Description  INTERVENTIONS:  - Assess patient's baseline mobility status (ambulation, transfers, stairs, etc )    - Identify cognitive and physical deficits and behaviors that affect mobility  - Identify mobility aids required to assist with transfers and/or ambulation (gait belt, sit-to-stand, lift, walker, cane, etc )  - [place] fall precautions as indicated by assessment  - Record patient progress and toleration of activity level on Mobility SBAR; progress patient to next Phase/Stage  - Instruct patient to call for assistance with activity based on assessment  - Consider rehabilitation consult to assist with strengthening/weightbearing, etc   Outcome: Progressing     Problem: DISCHARGE PLANNING  Goal: Discharge to home or other facility with appropriate resources  Description  INTERVENTIONS:  - Identify barriers to discharge w/patient and caregiver  - Arrange for needed discharge resources and transportation as appropriate  - Identify discharge learning needs (meds, wound care, etc )  - Arrange for interpretive services to assist at discharge as needed  - Refer to Case Management Department for coordinating discharge planning if the patient needs post-hospital services based on physician/advanced practitioner order or complex needs related to functional status, cognitive ability, or social support system  Outcome: Progressing     Problem: Knowledge Deficit  Goal: Patient/family/caregiver demonstrates understanding of disease process, treatment plan, medications, and discharge instructions  Description  Complete learning assessment and assess knowledge base    Interventions:  - Provide teaching at level of understanding  - Provide teaching via preferred learning methods  Outcome: Progressing
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
## 2019-08-26 NOTE — ASSESSMENT & PLAN NOTE
Elective admission for Ketamine therapy per [facility] specialist Dr Anurag Lakhani  Neurology consultation placed  Continue usual meds for now  Anticipate initiation of Ketamine in AM  D/w case management - may save a hospital day by streamlining the process of ketamine therapy for elective purposes

## 2019-08-27 PROBLEM — F11.20 OPIOID DEPENDENCE (HCC): Chronic | Status: ACTIVE | Noted: 2019-08-27

## 2019-08-27 LAB
ALBUMIN SERPL BCP-MCNC: 3.5 G/DL (ref 3.5–5)
ALP SERPL-CCNC: 48 U/L (ref 46–116)
ALT SERPL W P-5'-P-CCNC: 18 U/L (ref 12–78)
ANION GAP SERPL CALCULATED.3IONS-SCNC: 4 MMOL/L (ref 4–13)
AST SERPL W P-5'-P-CCNC: 11 U/L (ref 5–45)
BILIRUB SERPL-MCNC: 0.4 MG/DL (ref 0.2–1)
BUN SERPL-MCNC: 15 MG/DL (ref 5–25)
CALCIUM SERPL-MCNC: 9 MG/DL (ref 8.3–10.1)
CHLORIDE SERPL-SCNC: 107 MMOL/L (ref 100–108)
CO2 SERPL-SCNC: 31 MMOL/L (ref 21–32)
CREAT SERPL-MCNC: 0.89 MG/DL (ref 0.6–1.3)
ERYTHROCYTE [DISTWIDTH] IN BLOOD BY AUTOMATED COUNT: 12.8 % (ref 11.6–15.1)
GFR SERPL CREATININE-BSD FRML MDRD: 92 ML/MIN/1.73SQ M
GLUCOSE SERPL-MCNC: 99 MG/DL (ref 65–140)
HCT VFR BLD AUTO: 46.1 % (ref 36.5–49.3)
HGB BLD-MCNC: 15.1 G/DL (ref 12–17)
MCH RBC QN AUTO: 33.3 PG (ref 26.8–34.3)
MCHC RBC AUTO-ENTMCNC: 32.8 G/DL (ref 31.4–37.4)
MCV RBC AUTO: 102 FL (ref 82–98)
PLATELET # BLD AUTO: 214 THOUSANDS/UL (ref 149–390)
PMV BLD AUTO: 10 FL (ref 8.9–12.7)
POTASSIUM SERPL-SCNC: 4.4 MMOL/L (ref 3.5–5.3)
PROT SERPL-MCNC: 6.6 G/DL (ref 6.4–8.2)
RBC # BLD AUTO: 4.53 MILLION/UL (ref 3.88–5.62)
SODIUM SERPL-SCNC: 142 MMOL/L (ref 136–145)
WBC # BLD AUTO: 6.28 THOUSAND/UL (ref 4.31–10.16)

## 2019-08-27 PROCEDURE — 99232 SBSQ HOSP IP/OBS MODERATE 35: CPT | Performed by: INTERNAL MEDICINE

## 2019-08-27 PROCEDURE — 80053 COMPREHEN METABOLIC PANEL: CPT | Performed by: INTERNAL MEDICINE

## 2019-08-27 PROCEDURE — 85027 COMPLETE CBC AUTOMATED: CPT | Performed by: INTERNAL MEDICINE

## 2019-08-27 PROCEDURE — 99254 IP/OBS CNSLTJ NEW/EST MOD 60: CPT | Performed by: PSYCHIATRY & NEUROLOGY

## 2019-08-27 RX ORDER — VENLAFAXINE HYDROCHLORIDE 37.5 MG/1
75 CAPSULE, EXTENDED RELEASE ORAL
Status: DISCONTINUED | OUTPATIENT
Start: 2019-08-27 | End: 2019-08-30 | Stop reason: HOSPADM

## 2019-08-27 RX ORDER — HALOPERIDOL 5 MG/ML
2 INJECTION INTRAMUSCULAR EVERY 8 HOURS PRN
Status: DISCONTINUED | OUTPATIENT
Start: 2019-08-27 | End: 2019-08-30 | Stop reason: HOSPADM

## 2019-08-27 RX ORDER — GLYCOPYRROLATE 0.2 MG/ML
0.2 INJECTION INTRAMUSCULAR; INTRAVENOUS EVERY 4 HOURS PRN
Status: DISCONTINUED | OUTPATIENT
Start: 2019-08-27 | End: 2019-08-30 | Stop reason: HOSPADM

## 2019-08-27 RX ORDER — LORAZEPAM 2 MG/ML
1 INJECTION INTRAMUSCULAR EVERY 6 HOURS PRN
Status: DISCONTINUED | OUTPATIENT
Start: 2019-08-27 | End: 2019-08-30 | Stop reason: HOSPADM

## 2019-08-27 RX ADMIN — BACLOFEN 10 MG: 10 TABLET ORAL at 09:12

## 2019-08-27 RX ADMIN — Medication 0.1 MG/KG/HR: at 13:34

## 2019-08-27 RX ADMIN — HYDROCODONE BITARTRATE AND ACETAMINOPHEN 1 TABLET: 5; 325 TABLET ORAL at 00:09

## 2019-08-27 RX ADMIN — DIVALPROEX SODIUM 500 MG: 500 TABLET, DELAYED RELEASE ORAL at 09:11

## 2019-08-27 RX ADMIN — LORATADINE 10 MG: 10 TABLET ORAL at 09:12

## 2019-08-27 RX ADMIN — DIVALPROEX SODIUM 500 MG: 500 TABLET, DELAYED RELEASE ORAL at 22:22

## 2019-08-27 RX ADMIN — GABAPENTIN 300 MG: 300 CAPSULE ORAL at 16:24

## 2019-08-27 RX ADMIN — Medication 1000 MG: at 09:11

## 2019-08-27 RX ADMIN — GABAPENTIN 300 MG: 300 CAPSULE ORAL at 22:22

## 2019-08-27 RX ADMIN — HYDROCODONE BITARTRATE AND ACETAMINOPHEN 1 TABLET: 5; 325 TABLET ORAL at 09:11

## 2019-08-27 RX ADMIN — BACLOFEN 10 MG: 10 TABLET ORAL at 16:24

## 2019-08-27 RX ADMIN — GABAPENTIN 300 MG: 300 CAPSULE ORAL at 09:12

## 2019-08-27 RX ADMIN — SODIUM CHLORIDE 1000 ML: 0.9 INJECTION, SOLUTION INTRAVENOUS at 17:16

## 2019-08-27 RX ADMIN — BACLOFEN 10 MG: 10 TABLET ORAL at 22:22

## 2019-08-27 RX ADMIN — Medication 1000 MG: at 16:24

## 2019-08-27 RX ADMIN — VENLAFAXINE HYDROCHLORIDE 75 MG: 37.5 CAPSULE, EXTENDED RELEASE ORAL at 22:22

## 2019-08-27 RX ADMIN — HYDROCODONE BITARTRATE AND ACETAMINOPHEN 1 TABLET: 5; 325 TABLET ORAL at 13:46

## 2019-08-27 RX ADMIN — MELATONIN 3 MG: 3 TAB ORAL at 22:23

## 2019-08-27 RX ADMIN — OLANZAPINE 5 MG: 5 TABLET, FILM COATED ORAL at 22:23

## 2019-08-27 NOTE — PROGRESS NOTES
Progress Note - Anneliese Santillan 1955, 60 y o  male MRN: [id number]    Unit/Bed#: [facility] 709-01 Encounter: [id number]    Primary Care Provider: Aldair Patient, DO   Date and time admitted to hospital: 2019  3:42 PM    * Chronic migraine without aura without status migrainosus, not intractable  Assessment & Plan  · Elective admission for Ketamine therapy per [facility] specialist Dr Shivani Jiang  · Neurology consultation placed, f/u recommendations  · Continue current regimen       Opioid dependence ([address] )  Assessment & Plan  · In setting of chronic pain as evidence by chronic continuous use of opioids, requiring continuation of home duragesic patch      VTE Pharmacologic Prophylaxis:   Pharmacologic: Low risk  Mechanical VTE Prophylaxis in Place: Yes    Patient Centered Rounds: I have performed bedside rounds with nursing staff today  Discussions with Specialists or Other Care Team Provider:  [address] [employer] Neurology input    Education and Discussions with Family / Patient:  Patient  Time Spent for Care: 30 minutes  More than 50% of total time spent on counseling and coordination of care as described above  Current Length of Stay: 1 day(s)    Current Patient Status: Inpatient   Certification Statement: The patient will continue to require additional inpatient hospital stay due to Ketamine infusion per Neurology    Discharge Plan:  When headache better    Code Status: Level 1 - Full Code      Subjective:   Doing okay  Rates h/a 6/10 currently  Ketamine just now ordered by neurology and will be started by RN  Objective:     Vitals:   Temp (24hrs), Av 7 °F (37 1 °C), Min:98 1 °F (36 7 °C), Max:99 3 °F (37 4 °C)    Temp:  [98 1 °F (36 7 °C)-99 3 °F (37 4 °C)] 99 3 °F (37 4 °C)  HR:  [83-91] 86  Resp:  [18] 18  BP: (144-151)/(89-90) 144/90  SpO2:  [95 %-97 %] 97 %  Body mass index is 22 59 kg/m²  Input and Output Summary (last 24 hours):        Intake/Output Summary (Last 24 hours) at 2019 1128  Last data filed at 8/27/2019 0911  Gross per 24 hour   Intake 120 ml   Output --   Net 120 ml       Physical Exam:     Physical Exam   Constitutional: He is oriented to person, place, and time  No distress  Cardiovascular: Normal rate and regular rhythm  Pulmonary/Chest: Effort normal and breath sounds normal    Abdominal: Soft  Bowel sounds are normal    Musculoskeletal: He exhibits no edema  Neurological: He is alert and oriented to person, place, and time  No cranial nerve deficit  Skin: Skin is warm and dry  Nursing note and vitals reviewed  Additional Data:     Labs:    Results from last 7 days   Lab Units 08/27/19  0533   WBC Thousand/uL 6 28   HEMOGLOBIN g/dL 15 1   HEMATOCRIT % 46 1   PLATELETS Thousands/uL 214     Results from last 7 days   Lab Units 08/27/19  0533   POTASSIUM mmol/L 4 4   CHLORIDE mmol/L 107   CO2 mmol/L 31   BUN mg/dL 15   CREATININE mg/dL 0 89   CALCIUM mg/dL 9 0   ALK PHOS U/L 48   ALT U/L 18   AST U/L 11           * I Have Reviewed All Lab Data Listed Above  * Additional Pertinent Lab Tests Reviewed:  All Labs Within Last 24 Hours Reviewed    Imaging:    Imaging Reports Reviewed Today Include: none  Imaging Personally Reviewed by Myself Includes:  none    Recent Cultures (last 7 days):           Last 24 Hours Medication List:     Current Facility-Administered Medications:  baclofen 10 mg Oral TID Jordan Cordero MD   diphenhydrAMINE 25 mg Oral Q6H PRN Jordan Cordero MD   divalproex sodium 500 mg Oral Q12H [facility], MD   fentaNYL 1 patch Transdermal Ion Little MD   gabapentin 300 mg Oral TID Jordna Cordero MD   HYDROcodone-acetaminophen 1 tablet Oral Q4H PRN Jordan Cordero MD   loratadine 10 mg Oral Daily Yvonne Coello MD   magnesium gluconate 1,000 mg Oral BID Jordan Cordero MD   melatonin 3 mg Oral HS Jordan Cordero MD   OLANZapine 5 mg Oral HS Jordan Cordero MD   ondansetron 4 mg Intravenous Q6H PRN Nicolas Hector MD [facility]   venlafaxine 75 mg Oral HS Brian Rico PA-C        Today, Patient Was Seen By: Brian Rico PA-C    ** Please Note: Dictation voice to text software may have been used in the creation of this document   **

## 2019-08-27 NOTE — SOCIAL WORK
Met with patient to discuss CM program/role  Pt lives with wife, son and daughter in law and melchor in a 2 story house with 5 AMBER and 14 steps to bedroom  Independent ADL's/ ambulation pta  PCP: Dr Te Malik  Has a prescription plan  Pharmacy is [facility] in [facility]  Is interested in meds to bed  He does drive  No DME or prior [facility]  or inpatient rehab  Denies h/o mental illness or IP/OP psych  Denies h/o alcohol or drug abuse  Primary contact is wife Sterling Jackson [phone]  Does not have POA/LW  Wife will transport upon d/c    CM reviewed d/c planning process including the following: identifying help at home, patient preference for d/c planning needs, Discharge Lounge, [facility] Meds to Bed program, availability of treatment team to discuss questions or concerns patient and/or family may have regarding understanding medications and recognizing signs and symptoms once discharged  CM also encouraged patient to follow up with all recommended appointments after discharge  Patient advised of importance for patient and family to participate in managing patients medical well being  Patient/caregiver received discharge checklist   Content reviewed  Patient/caregiver encouraged to participate in discharge plan of care prior to discharge home
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
## 2019-08-27 NOTE — PLAN OF CARE
Problem: PAIN - ADULT  Goal: Verbalizes/displays adequate comfort level or baseline comfort level  Description  Interventions:  - Encourage patient to monitor pain and request assistance  - Assess pain using appropriate pain scale  - Administer analgesics based on type and severity of pain and evaluate response  - Implement non-pharmacological measures as appropriate and evaluate response  - Notify physician/advanced practitioner if interventions unsuccessful or patient reports new pain   Outcome: Progressing     Problem: SAFETY ADULT  Goal: Patient will remain free of falls  Description  INTERVENTIONS:  - Assess patient frequently for physical needs  -  Identify cognitive and physical deficits and behaviors that affect risk of falls    -  [place] fall precautions as indicated by assessment   - Educate patient/family on patient safety including physical limitations  - Instruct patient to call for assistance with activity based on assessment  - Modify environment to reduce risk of injury  - Consider OT/PT consult to assist with strengthening/mobility  Outcome: Progressing  Goal: Maintain or return to baseline ADL function  Description  INTERVENTIONS:  -  Assess patient's ability to carry out ADLs; assess patient's baseline for ADL function and identify physical deficits which impact ability to perform ADLs (bathing, care of mouth/teeth, toileting, grooming, dressing, etc )  - Assess/evaluate cause of self-care deficits   - Assess range of motion  - Assess patient's mobility; develop plan if impaired  - Assess patient's need for assistive devices and provide as appropriate  - Encourage maximum independence but intervene and supervise when necessary  - Involve family in performance of ADLs  - Assess for home care needs following discharge   - Consider OT consult to assist with ADL evaluation and planning for discharge  - Provide patient education as appropriate  Outcome: Progressing  Goal: Maintain or return mobility status to optimal level  Description  INTERVENTIONS:  - Assess patient's baseline mobility status (ambulation, transfers, stairs, etc )    - Identify cognitive and physical deficits and behaviors that affect mobility  - Identify mobility aids required to assist with transfers and/or ambulation (gait belt, sit-to-stand, lift, walker, cane, etc )  - [place] fall precautions as indicated by assessment  - Record patient progress and toleration of activity level on Mobility SBAR; progress patient to next Phase/Stage  - Instruct patient to call for assistance with activity based on assessment  - Consider rehabilitation consult to assist with strengthening/weightbearing, etc   Outcome: Progressing     Problem: DISCHARGE PLANNING  Goal: Discharge to home or other facility with appropriate resources  Description  INTERVENTIONS:  - Identify barriers to discharge w/patient and caregiver  - Arrange for needed discharge resources and transportation as appropriate  - Identify discharge learning needs (meds, wound care, etc )  - Refer to Case Management Department for coordinating discharge planning if the patient needs post-hospital services based on physician/advanced practitioner order or complex needs related to functional status, cognitive ability, or social support system   Outcome: Progressing     Problem: Knowledge Deficit  Goal: Patient/family/caregiver demonstrates understanding of disease process, treatment plan, medications, and discharge instructions  Description  Complete learning assessment and assess knowledge base  Interventions:  - Provide teaching at level of understanding  - Provide teaching via preferred learning methods  Outcome: Progressing     Problem: Potential for Falls  Goal: Patient will remain free of falls  Description  INTERVENTIONS:  - Assess patient frequently for physical needs  -  Identify cognitive and physical deficits and behaviors that affect risk of falls    -  [place] fall precautions as indicated by assessment   - Educate patient/family on patient safety including physical limitations  - Instruct patient to call for assistance with activity based on assessment  - Modify environment to reduce risk of injury  - Consider OT/PT consult to assist with strengthening/mobility  Outcome: Progressing
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
## 2019-08-27 NOTE — ASSESSMENT & PLAN NOTE
· In setting of chronic pain as evidence by chronic continuous use of opioids, requiring continuation of home duragesic patch

## 2019-08-27 NOTE — ASSESSMENT & PLAN NOTE
· Elective admission for Ketamine therapy per [facility] specialist Dr Lucero López  · Neurology consultation placed, f/u recommendations  · Continue current regimen

## 2019-08-27 NOTE — PROGRESS NOTES
Patient laying in bed comfortably, call bell within reach  C/o 5/10 headache but does not require any prn pain meds  A+Ox4, no complaints as of this time  Denies nausea, dizziness, sob   Will continue to monitor

## 2019-08-27 NOTE — RESTORATIVE TECHNICIAN NOTE
Restorative Specialist Mobility Note       Activity: Ambulate in barker, Ambulate in room, Bathroom privileges, Chair, Dangle, Stand at bedside(Educated/encouraged pt to ambulate with assistance 3-4 x's/day   Pt callbell, phone/tray within reach )     Assistive Device: None       Namrata LEES, Restorative Technician, [employer] [facility]

## 2019-08-28 PROCEDURE — 99232 SBSQ HOSP IP/OBS MODERATE 35: CPT | Performed by: INTERNAL MEDICINE

## 2019-08-28 PROCEDURE — 99232 SBSQ HOSP IP/OBS MODERATE 35: CPT | Performed by: PSYCHIATRY & NEUROLOGY

## 2019-08-28 RX ORDER — MAGNESIUM SULFATE HEPTAHYDRATE 40 MG/ML
2 INJECTION, SOLUTION INTRAVENOUS
Status: COMPLETED | OUTPATIENT
Start: 2019-08-28 | End: 2019-08-28

## 2019-08-28 RX ORDER — KETOROLAC TROMETHAMINE 30 MG/ML
15 INJECTION, SOLUTION INTRAMUSCULAR; INTRAVENOUS ONCE
Status: COMPLETED | OUTPATIENT
Start: 2019-08-28 | End: 2019-08-28

## 2019-08-28 RX ADMIN — GABAPENTIN 300 MG: 300 CAPSULE ORAL at 17:15

## 2019-08-28 RX ADMIN — Medication 0.5 MG/KG/HR: at 21:46

## 2019-08-28 RX ADMIN — GABAPENTIN 300 MG: 300 CAPSULE ORAL at 07:55

## 2019-08-28 RX ADMIN — VENLAFAXINE HYDROCHLORIDE 75 MG: 37.5 CAPSULE, EXTENDED RELEASE ORAL at 21:01

## 2019-08-28 RX ADMIN — BACLOFEN 10 MG: 10 TABLET ORAL at 21:00

## 2019-08-28 RX ADMIN — BACLOFEN 10 MG: 10 TABLET ORAL at 17:16

## 2019-08-28 RX ADMIN — BACLOFEN 10 MG: 10 TABLET ORAL at 07:55

## 2019-08-28 RX ADMIN — LORATADINE 10 MG: 10 TABLET ORAL at 07:55

## 2019-08-28 RX ADMIN — MAGNESIUM SULFATE HEPTAHYDRATE 2 G: 40 INJECTION, SOLUTION INTRAVENOUS at 11:44

## 2019-08-28 RX ADMIN — Medication 1000 MG: at 17:19

## 2019-08-28 RX ADMIN — MELATONIN 3 MG: 3 TAB ORAL at 21:01

## 2019-08-28 RX ADMIN — Medication 1000 MG: at 07:55

## 2019-08-28 RX ADMIN — DIVALPROEX SODIUM 500 MG: 500 TABLET, DELAYED RELEASE ORAL at 21:00

## 2019-08-28 RX ADMIN — KETOROLAC TROMETHAMINE 15 MG: 30 INJECTION, SOLUTION INTRAMUSCULAR at 09:52

## 2019-08-28 RX ADMIN — DIVALPROEX SODIUM 500 MG: 500 TABLET, DELAYED RELEASE ORAL at 07:54

## 2019-08-28 RX ADMIN — GABAPENTIN 300 MG: 300 CAPSULE ORAL at 20:59

## 2019-08-28 RX ADMIN — OLANZAPINE 5 MG: 5 TABLET, FILM COATED ORAL at 21:00

## 2019-08-28 RX ADMIN — Medication 0.4 MG/KG/HR: at 14:16

## 2019-08-28 RX ADMIN — Medication 0.3 MG/KG/HR: at 01:39

## 2019-08-28 RX ADMIN — DIPHENHYDRAMINE HCL 25 MG: 25 TABLET, COATED ORAL at 23:28

## 2019-08-28 NOTE — ASSESSMENT & PLAN NOTE
· In setting of chronic pain as evidence by chronic continuous use of opioids, requiring continuation of home duragesic patch  · Plan as outlined above

## 2019-08-28 NOTE — RESTORATIVE TECHNICIAN NOTE
Restorative Specialist Mobility Note       Activity: Ambulate in barker, Ambulate in room, Bathroom privileges, Chair, Dangle, Stand at bedside(Educated/encouraged pt to ambulate with assistance 3-4 x's/day   Pt callbell, phone/tray within reach )     Assistive Device: None          Richa LEES, Restorative Technician, [employer] [facility]

## 2019-08-28 NOTE — ASSESSMENT & PLAN NOTE
· Elective admission for Ketamine therapy per [facility] specialist Dr Kelly Monday  · [address] Neurology recommendations  · Patient still complains of greater than 5/10 headache this morning  Will give him x1 dose 15 mg Toradol as well as 2 g of IV magnesium  · Continue ketamine drip, most likely Neurology will be increasing his drip today  Neurology May be considering IV Depacon when they start weaning ketamine  Follow up Neurology recommendations  Appreciate their assistance  · While on ketamine drip, continue Haldol p r n , Ativan p r n , and Glycopyrrolate p r n    · Continue Depakote and gabapentin  · No opiates are indicated at this time (thought to be component of rebound headache from chronic continuous use of opiates)

## 2019-08-28 NOTE — PLAN OF CARE
Problem: PAIN - ADULT  Goal: Verbalizes/displays adequate comfort level or baseline comfort level  Description  Interventions:  - Encourage patient to monitor pain and request assistance  - Assess pain using appropriate pain scale  - Administer analgesics based on type and severity of pain and evaluate response  - Implement non-pharmacological measures as appropriate and evaluate response  - Notify physician/advanced practitioner if interventions unsuccessful or patient reports new pain   Outcome: Progressing     Problem: SAFETY ADULT  Goal: Patient will remain free of falls  Description  INTERVENTIONS:  - Assess patient frequently for physical needs  -  Identify cognitive and physical deficits and behaviors that affect risk of falls    -  [place] fall precautions as indicated by assessment   - Educate patient/family on patient safety including physical limitations  - Instruct patient to call for assistance with activity based on assessment  - Modify environment to reduce risk of injury  - Consider OT/PT consult to assist with strengthening/mobility  Outcome: Progressing  Goal: Maintain or return to baseline ADL function  Description  INTERVENTIONS:  -  Assess patient's ability to carry out ADLs; assess patient's baseline for ADL function and identify physical deficits which impact ability to perform ADLs (bathing, care of mouth/teeth, toileting, grooming, dressing, etc )  - Assess/evaluate cause of self-care deficits   - Assess range of motion  - Assess patient's mobility; develop plan if impaired  - Assess patient's need for assistive devices and provide as appropriate  - Encourage maximum independence but intervene and supervise when necessary  - Involve family in performance of ADLs  - Assess for home care needs following discharge   - Consider OT consult to assist with ADL evaluation and planning for discharge  - Provide patient education as appropriate  Outcome: Progressing  Goal: Maintain or return mobility status to optimal level  Description  INTERVENTIONS:  - Assess patient's baseline mobility status (ambulation, transfers, stairs, etc )    - Identify cognitive and physical deficits and behaviors that affect mobility  - Identify mobility aids required to assist with transfers and/or ambulation (gait belt, sit-to-stand, lift, walker, cane, etc )  - [place] fall precautions as indicated by assessment  - Record patient progress and toleration of activity level on Mobility SBAR; progress patient to next Phase/Stage  - Instruct patient to call for assistance with activity based on assessment  - Consider rehabilitation consult to assist with strengthening/weightbearing, etc   Outcome: Progressing     Problem: DISCHARGE PLANNING  Goal: Discharge to home or other facility with appropriate resources  Description  INTERVENTIONS:  - Identify barriers to discharge w/patient and caregiver  - Arrange for needed discharge resources and transportation as appropriate  - Identify discharge learning needs (meds, wound care, etc )  - Refer to Case Management Department for coordinating discharge planning if the patient needs post-hospital services based on physician/advanced practitioner order or complex needs related to functional status, cognitive ability, or social support system   Outcome: Progressing     Problem: Knowledge Deficit  Goal: Patient/family/caregiver demonstrates understanding of disease process, treatment plan, medications, and discharge instructions  Description  Complete learning assessment and assess knowledge base  Interventions:  - Provide teaching at level of understanding  - Provide teaching via preferred learning methods  Outcome: Progressing     Problem: Potential for Falls  Goal: Patient will remain free of falls  Description  INTERVENTIONS:  - Assess patient frequently for physical needs  -  Identify cognitive and physical deficits and behaviors that affect risk of falls    -  [place] fall precautions as indicated by assessment   - Educate patient/family on patient safety including physical limitations  - Instruct patient to call for assistance with activity based on assessment  - Modify environment to reduce risk of injury  - Consider OT/PT consult to assist with strengthening/mobility  Outcome: Progressing

## 2019-08-28 NOTE — PROGRESS NOTES
Progress Note - Rell Wood 1955, 60 y o  male MRN: [id number]    Unit/Bed#: [facility] 709-01 Encounter: [id number]    Primary Care Provider: Marco Antonio Mcdaniels DO   Date and time admitted to hospital: 8/26/2019  3:42 PM      * Chronic migraine without aura without status migrainosus, not intractable  Assessment & Plan  · Elective admission for Ketamine therapy per [facility] specialist Dr Rosa Mendoza  · [address] Neurology recommendations  · Patient still complains of greater than 5/10 headache this morning  Will give him x1 dose 15 mg Toradol as well as 2 g of IV magnesium  · Continue ketamine drip, most likely Neurology will be increasing his drip today  Neurology May be considering IV Depacon when they start weaning ketamine  Follow up Neurology recommendations  Appreciate their assistance  · While on ketamine drip, continue Haldol p r n , Ativan p r n , and Glycopyrrolate p r n  · Continue Depakote and gabapentin  · No opiates are indicated at this time (thought to be component of rebound headache from chronic continuous use of opiates)      Opioid dependence (HCC)  Assessment & Plan  · In setting of chronic pain as evidence by chronic continuous use of opioids, requiring continuation of home duragesic patch  · Plan as outlined above    VTE Pharmacologic Prophylaxis:   Pharmacologic: Low risk VTE  Mechanical VTE Prophylaxis in Place: Yes    Patient Centered Rounds: I have performed bedside rounds with nursing staff today  Discussions with Specialists or Other Care Team Provider:  Neurology    Education and Discussions with Family / Patient:  Patient    Time Spent for Care: 45 minutes  More than 50% of total time spent on counseling and coordination of care as described above      Current Length of Stay: 2 day(s)    Current Patient Status: Inpatient   Certification Statement: The patient will continue to require additional inpatient hospital stay due to Ketamine infusion      Code Status: Level 1 - Full Code      Subjective:   Patient seen and examined this morning at bedside  Patient complains of bifrontal 8/10 headache this morning  He has some associated nausea  He did not sleep well overnight  Denies any chest pain or shortness of breath    Objective:     Vitals:   Temp (24hrs), Av °F (37 2 °C), Min:98 8 °F (37 1 °C), Max:99 1 °F (37 3 °C)    Temp:  [98 8 °F (37 1 °C)-99 1 °F (37 3 °C)] 98 8 °F (37 1 °C)  HR:  [80-99] 99  Resp:  [16-22] 22  BP: (118-156)/(67-98) 143/83  SpO2:  [93 %-98 %] 95 %  Body mass index is 22 59 kg/m²  Input and Output Summary (last 24 hours): Intake/Output Summary (Last 24 hours) at 2019 1622  Last data filed at 2019 1541  Gross per 24 hour   Intake 1761 65 ml   Output --   Net 1761 65 ml       Physical Exam:     Physical Exam   Constitutional: He is oriented to person, place, and time  He appears well-developed and well-nourished  HENT:   Head: Normocephalic and atraumatic  Eyes: Pupils are equal, round, and reactive to light  Neck: Normal range of motion  No JVD present  Cardiovascular: Normal rate and regular rhythm  Exam reveals no friction rub  No murmur heard  Pulmonary/Chest: Effort normal  No stridor  No respiratory distress  He has no wheezes  Abdominal: Soft  He exhibits no distension  There is no tenderness  Musculoskeletal: Normal range of motion  He exhibits no edema  Neurological: He is alert and oriented to person, place, and time  No cranial nerve deficit  Nursing note and vitals reviewed        Additional Data:     Labs:    Results from last 7 days   Lab Units 19  0533   WBC Thousand/uL 6 28   HEMOGLOBIN g/dL 15 1   HEMATOCRIT % 46 1   PLATELETS Thousands/uL 214     Results from last 7 days   Lab Units 19  0533   SODIUM mmol/L 142   POTASSIUM mmol/L 4 4   CHLORIDE mmol/L 107   CO2 mmol/L 31   BUN mg/dL 15   CREATININE mg/dL 0 89   ANION GAP mmol/L 4   CALCIUM mg/dL 9 0   ALBUMIN g/dL 3 5   TOTAL BILIRUBIN mg/dL 0 40   ALK PHOS U/L 48   ALT U/L 18   AST U/L 11   GLUCOSE RANDOM mg/dL 99                       * I Have Reviewed All Lab Data Listed Above  * Additional Pertinent Lab Tests Reviewed: All Labs Within Last 24 Hours Reviewed    Last 24 Hours Medication List:     Current Facility-Administered Medications:  baclofen 10 mg Oral TID Petey Hollis MD    diphenhydrAMINE 25 mg Oral Q6H PRN Petey Hollis MD    divalproex sodium 500 mg Oral Q12H [address] Petey Hollis, MD    gabapentin 300 mg Oral TID Petey Hollis MD    glycopyrrolate 0 2 mg Intravenous Q4H PRN Cassandra Devarinti, DO    haloperidol lactate 2 mg Intramuscular Q8H PRN Cassandra Devarinti, DO    ketamine 0 5 mg/kg/hr Intravenous Continuous Osvaldo Noter,  Last Rate: 0 5 mg/kg/hr (08/28/19 1541)   loratadine 10 mg Oral Daily Yvonne Coello MD    LORazepam 1 mg Intravenous Q6H PRN Cassandra Devarinti, DO    magnesium gluconate 1,000 mg Oral BID Petey Hollis MD    melatonin 3 mg Oral HS Petey Hollis MD    OLANZapine 5 mg Oral HS Petey Hollis MD    ondansetron 4 mg Intravenous Q6H PRN Petey Hollis MD    venlafaxine 75 mg Oral HS Tim Raymond PA-C         Today, Patient Was Seen By: La Randolph MD    ** Please Note: Dictation voice to text software may have been used in the creation of this document   **

## 2019-08-28 NOTE — PROGRESS NOTES
NEUROLOGY Consultation Progress Note  Service: Neurology  Patient: Sonya Munoz 60 y o  male   MRN: [id number]  PCP: Denise Macias DO  [facility] Encounter: [id number]  Date Of Visit: 08/28/19    Assessment/Plan:    1  Chronic migraine w/out aura w/out status migrainosus, not intractable  · Continue IV ketamine 250 mg w/ titration as warranted q2h not to exceed 0 6 mg/kg/hr; presently at 0 5mg/kg/hr  · Consider initiation of IV Depacon at time of ketamine discontinuation; prior toleration of Depakote 500 mg BID  · Continue telemetry  · Continue Haldol 2 mg q8h PRN for severe agitation, hallucinations  · Continue Ativan 1mg q6h PRN for agitation and anxiety  · Continue Glycopyrrolate 0 2 mg q4h PRN for secretions  2  Chronic, continuous use of opioids  · Discontinued Fentanyl 25 mcg/hr every 3rd day  · Discontinued Norco 5/325 q4h PRN  3  Insomnia  · Continue Zyprexa 5 mg HS  VTE Pharmacologic Prophylaxis:   Pharmacologic: Low VTE score  Mechanical VTE Prophylaxis in Place: No    Patient Centered Rounds: I have performed bedside rounds with nursing staff today  Discussions with Specialists or Other Care Team Provider: SLIM  Education and Discussions with Family / Patient: Patient  Time Spent for Care: 30 minutes  More than 50% of total time spent on counseling and coordination of care as described above  Current Length of Stay: 2 day(s)    Current Patient Status: Inpatient   Certification Statement: The patient will continue to require additional inpatient hospital stay due to continued IV Ketamine therapy  Discharge Plan: Defer to primary team      Code Status: Level 1 - Full Code    Subjective:    Continued complaint of headache this morning, described as a non-radiating sharp/shooting pain, 7/10  Patient endorsed lesser lightheadedness this a m, although he admitted to slight nausea  Able to ambulate without incident   He denied changes in vision, shortness of breath and chest pain/pressure/paliptations  Objective:     Vitals:   Temp (24hrs), Av 6 °F (37 °C), Min:98 2 °F (36 8 °C), Max:99 1 °F (37 3 °C)    Temp:  [98 2 °F (36 8 °C)-99 1 °F (37 3 °C)] 99 1 °F (37 3 °C)  HR:  [79-98] 91  Resp:  [16-18] 18  BP: (118-156)/(67-97) 156/97  SpO2:  [91 %-97 %] 97 %  Body mass index is 22 59 kg/m²  Input and Output Summary (last 24 hours): Intake/Output Summary (Last 24 hours) at 2019 0932  Last data filed at 2019 1845  Gross per 24 hour   Intake 1420 ml   Output --   Net 1420 ml       Physical Exam:     Physical Exam   Constitutional: He is oriented to person, place, and time  Vital signs are normal  He appears well-developed and well-nourished  He is cooperative  He does not appear ill  No distress  HENT:   Head: Normocephalic and atraumatic  Eyes: Pupils are equal, round, and reactive to light  EOM are normal    Neck: Normal range of motion  Neck supple  Cardiovascular: Normal rate, regular rhythm, normal heart sounds, intact distal pulses and normal pulses  Exam reveals no friction rub  No murmur heard  Pulmonary/Chest: Effort normal and breath sounds normal  No respiratory distress  He has no decreased breath sounds  Abdominal: Soft  Normal appearance and bowel sounds are normal  He exhibits no distension and no abdominal bruit  There is no tenderness  There is no rigidity and no rebound  Musculoskeletal: Normal range of motion  He exhibits no edema  Neurological: He is alert and oriented to person, place, and time  Skin: Skin is warm, dry and intact  Capillary refill takes less than 2 seconds  He is not diaphoretic  Psychiatric: He has a normal mood and affect  His speech is normal and behavior is normal  Judgment and thought content normal    Vitals reviewed  Neurologic Exam   Mental Status:  Alert and oriented x4  Able to adhere to commands  Appropriate attention and concentration    Regular rate and rhythm regarding speech  Cranial Nerves:  CN 2 - 12 intact  Motor Exam:  Muscle bulk: normal  Overall muscle tone: normal  Strength: 5/5 throughout  Motor: able to achieve fine motor movements  Finger-nose-finger intact  Sensory Exam:  Light touch intact  Vibration/proprioception intact  Gait, Coordination, and Reflexes   Reflexes:  Right brachioradialis: 2/4  Left brachioradialis: 2/4  Right biceps: 2/4  Left biceps: 2/4  Right triceps: 2/4  Left triceps: 2/4  Right patellar: 2/4  Left patellar: 2/4  Right achilles: 2/4  Left achilles: 2/4  Babinski: absent  Clonus: absent  Gait: unremarkable  Coordination: appropriate heel-to-shin  Additional Data:     Labs:    Results from last 7 days   Lab Units 08/27/19  0533   WBC Thousand/uL 6 28   HEMOGLOBIN g/dL 15 1   HEMATOCRIT % 46 1   PLATELETS Thousands/uL 214     Results from last 7 days   Lab Units 08/27/19  0533   POTASSIUM mmol/L 4 4   CHLORIDE mmol/L 107   CO2 mmol/L 31   BUN mg/dL 15   CREATININE mg/dL 0 89   CALCIUM mg/dL 9 0   ALK PHOS U/L 48   ALT U/L 18   AST U/L 11           * I Have Reviewed All Lab Data Listed Above  * Additional Pertinent Lab Tests Reviewed: All Labs For Current Hospital Admission Reviewed    Imaging:    Imaging Reports Reviewed Today Include: None  Imaging Personally Reviewed by Myself Includes:  None       Recent Cultures (last 7 days):           Last 24 Hours Medication List:     Current Facility-Administered Medications:  baclofen 10 mg Oral TID Blanquita Sams MD    diphenhydrAMINE 25 mg Oral Q6H PRN Blanquita Sams MD    divalproex sodium 500 mg Oral Q12H [address] Blanquita Sams MD    gabapentin 300 mg Oral TID Blanquita Sams MD    glycopyrrolate 0 2 mg Intravenous Q4H PRN Cassandra Hull DO    haloperidol lactate 2 mg Intramuscular Q8H PRN Cassandra Hull DO    ketamine 0 3 mg/kg/hr Intravenous Continuous ILYA Miller Last Rate: 0 3 mg/kg/hr (08/28/19 0139)   loratadine 10 mg Oral Daily Yvonne Coello MD    LORazepam 1 mg Intravenous Q6H PRN Cassandra Hull DO    magnesium gluconate 1,000 mg Oral BID Christina Mojica MD    melatonin 3 mg Oral HS Christina Mojica MD    OLANZapine 5 mg Oral HS Christina Mojica MD    ondansetron 4 mg Intravenous Q6H PRN Christina Mojica MD    venlafaxine 75 mg Oral HS Hiren Tam PA-C         Today, Patient Was Seen By: Sharlette Ready Holter, DO    ** Please Note: Dragon 360 Dictation voice to text software may have been used in the creation of this document   **

## 2019-08-28 NOTE — PLAN OF CARE
Problem: PAIN - ADULT  Goal: Verbalizes/displays adequate comfort level or baseline comfort level  Description  Interventions:  - Encourage patient to monitor pain and request assistance  - Assess pain using appropriate pain scale  - Administer analgesics based on type and severity of pain and evaluate response  - Implement non-pharmacological measures as appropriate and evaluate response  - Notify physician/advanced practitioner if interventions unsuccessful or patient reports new pain   Outcome: Progressing     Problem: SAFETY ADULT  Goal: Patient will remain free of falls  Description  INTERVENTIONS:  - Assess patient frequently for physical needs  -  Identify cognitive and physical deficits and behaviors that affect risk of falls    -  [place] fall precautions as indicated by assessment   - Educate patient/family on patient safety including physical limitations  - Instruct patient to call for assistance with activity based on assessment  - Modify environment to reduce risk of injury  - Consider OT/PT consult to assist with strengthening/mobility  Outcome: Progressing  Goal: Maintain or return to baseline ADL function  Description  INTERVENTIONS:  -  Assess patient's ability to carry out ADLs; assess patient's baseline for ADL function and identify physical deficits which impact ability to perform ADLs (bathing, care of mouth/teeth, toileting, grooming, dressing, etc )  - Assess/evaluate cause of self-care deficits   - Assess range of motion  - Assess patient's mobility; develop plan if impaired  - Assess patient's need for assistive devices and provide as appropriate  - Encourage maximum independence but intervene and supervise when necessary  - Involve family in performance of ADLs  - Assess for home care needs following discharge   - Consider OT consult to assist with ADL evaluation and planning for discharge  - Provide patient education as appropriate  Outcome: Progressing  Goal: Maintain or return mobility status to optimal level  Description  INTERVENTIONS:  - Assess patient's baseline mobility status (ambulation, transfers, stairs, etc )    - Identify cognitive and physical deficits and behaviors that affect mobility  - Identify mobility aids required to assist with transfers and/or ambulation (gait belt, sit-to-stand, lift, walker, cane, etc )  - [place] fall precautions as indicated by assessment  - Record patient progress and toleration of activity level on Mobility SBAR; progress patient to next Phase/Stage  - Instruct patient to call for assistance with activity based on assessment  - Consider rehabilitation consult to assist with strengthening/weightbearing, etc   Outcome: Progressing     Problem: DISCHARGE PLANNING  Goal: Discharge to home or other facility with appropriate resources  Description  INTERVENTIONS:  - Identify barriers to discharge w/patient and caregiver  - Arrange for needed discharge resources and transportation as appropriate  - Identify discharge learning needs (meds, wound care, etc )  - Refer to Case Management Department for coordinating discharge planning if the patient needs post-hospital services based on physician/advanced practitioner order or complex needs related to functional status, cognitive ability, or social support system   Outcome: Progressing     Problem: Knowledge Deficit  Goal: Patient/family/caregiver demonstrates understanding of disease process, treatment plan, medications, and discharge instructions  Description  Complete learning assessment and assess knowledge base  Interventions:  - Provide teaching at level of understanding  - Provide teaching via preferred learning methods  Outcome: Progressing     Problem: Potential for Falls  Goal: Patient will remain free of falls  Description  INTERVENTIONS:  - Assess patient frequently for physical needs  -  Identify cognitive and physical deficits and behaviors that affect risk of falls    -  [place] fall precautions as indicated by assessment   - Educate patient/family on patient safety including physical limitations  - Instruct patient to call for assistance with activity based on assessment  - Modify environment to reduce risk of injury  - Consider OT/PT consult to assist with strengthening/mobility  Outcome: Progressing     Problem: NEUROSENSORY - ADULT  Goal: Achieves stable or improved neurological status  Description  INTERVENTIONS  - Monitor and report changes in neurological status  - Monitor vital signs such as temperature, blood pressure, glucose, and any other labs ordered   - Monitor for seizure activity and implement precautions if appropriate       Outcome: Progressing

## 2019-08-29 ENCOUNTER — DOCUMENTATION (OUTPATIENT)
Dept: NEUROLOGY | Facility: CLINIC | Age: 64
End: 2019-08-29

## 2019-08-29 LAB
ANION GAP SERPL CALCULATED.3IONS-SCNC: 5 MMOL/L (ref 4–13)
BUN SERPL-MCNC: 11 MG/DL (ref 5–25)
CALCIUM SERPL-MCNC: 8.7 MG/DL (ref 8.3–10.1)
CHLORIDE SERPL-SCNC: 111 MMOL/L (ref 100–108)
CO2 SERPL-SCNC: 29 MMOL/L (ref 21–32)
CREAT SERPL-MCNC: 0.83 MG/DL (ref 0.6–1.3)
GFR SERPL CREATININE-BSD FRML MDRD: 94 ML/MIN/1.73SQ M
GLUCOSE SERPL-MCNC: 101 MG/DL (ref 65–140)
POTASSIUM SERPL-SCNC: 4.2 MMOL/L (ref 3.5–5.3)
SODIUM SERPL-SCNC: 145 MMOL/L (ref 136–145)

## 2019-08-29 PROCEDURE — 99232 SBSQ HOSP IP/OBS MODERATE 35: CPT | Performed by: PHYSICIAN ASSISTANT

## 2019-08-29 PROCEDURE — 99232 SBSQ HOSP IP/OBS MODERATE 35: CPT | Performed by: PSYCHIATRY & NEUROLOGY

## 2019-08-29 PROCEDURE — 80048 BASIC METABOLIC PNL TOTAL CA: CPT | Performed by: INTERNAL MEDICINE

## 2019-08-29 RX ADMIN — Medication 0.5 MG/KG/HR: at 04:54

## 2019-08-29 RX ADMIN — LORATADINE 10 MG: 10 TABLET ORAL at 08:49

## 2019-08-29 RX ADMIN — Medication 0.6 MG/KG/HR: at 11:27

## 2019-08-29 RX ADMIN — DIVALPROEX SODIUM 500 MG: 500 TABLET, DELAYED RELEASE ORAL at 08:49

## 2019-08-29 RX ADMIN — Medication 0.5 MG/KG/HR: at 17:39

## 2019-08-29 RX ADMIN — VENLAFAXINE HYDROCHLORIDE 75 MG: 37.5 CAPSULE, EXTENDED RELEASE ORAL at 21:31

## 2019-08-29 RX ADMIN — MELATONIN 3 MG: 3 TAB ORAL at 21:31

## 2019-08-29 RX ADMIN — DIVALPROEX SODIUM 500 MG: 500 TABLET, DELAYED RELEASE ORAL at 21:31

## 2019-08-29 RX ADMIN — Medication 1000 MG: at 17:40

## 2019-08-29 RX ADMIN — BACLOFEN 10 MG: 10 TABLET ORAL at 08:49

## 2019-08-29 RX ADMIN — BACLOFEN 10 MG: 10 TABLET ORAL at 17:41

## 2019-08-29 RX ADMIN — GABAPENTIN 300 MG: 300 CAPSULE ORAL at 08:49

## 2019-08-29 RX ADMIN — BACLOFEN 10 MG: 10 TABLET ORAL at 21:31

## 2019-08-29 RX ADMIN — Medication 1000 MG: at 08:50

## 2019-08-29 RX ADMIN — OLANZAPINE 5 MG: 5 TABLET, FILM COATED ORAL at 21:31

## 2019-08-29 RX ADMIN — GABAPENTIN 300 MG: 300 CAPSULE ORAL at 17:41

## 2019-08-29 RX ADMIN — GABAPENTIN 300 MG: 300 CAPSULE ORAL at 21:31

## 2019-08-29 NOTE — PROGRESS NOTES
NEUROLOGY Consultation Progress Note  Service: Neurology  Patient: Flavio Acosta 60 y o  male   MRN: [id number]  PCP: Gerri Kimble DO  [facility] Encounter: [id number]  Date Of Visit: 08/29/19    Assessment/Plan:  1  Chronic migraine w/out aura w/out status migrainosus, not intractable  · Continue IV ketamine 250 mg at 0 6mg/kg/hr with initial taper of dose to 0 5 this p m  in preparation for discontinuation  · Initiation of IV Depacon at Ketamine dose of 0 3mg/kg/hr  ; prior toleration of p o  Depakote 500 mg BID  · Continue telemetry  · Continue Haldol 2 mg q8h PRN for severe agitation, hallucinations  · Continue Ativan 1mg q6h PRN for agitation and anxiety  · Continue Glycopyrrolate 0 2 mg q4h PRN for secretions  2  Chronic, continuous use of opioids  · Continue to monitor vitals and symptomatology secondary to discontinuation of opioids  · Discontinued Fentanyl 25 mcg/hr every 3rd day  · Discontinued Norco 5/325 q4h PRN  3  Insomnia  · Continue Zyprexa 5 mg HS  VTE Pharmacologic Prophylaxis:   Pharmacologic: Low VTE score  Mechanical VTE Prophylaxis in Place: No    Patient Centered Rounds: I have performed bedside rounds with nursing staff today  Discussions with Specialists or Other Care Team Provider: SLIM  Education and Discussions with Family / Patient: Patient  Time Spent for Care: 30 minutes  More than 50% of total time spent on counseling and coordination of care as described above  Current Length of Stay: 3 day(s)    Current Patient Status: Inpatient   Certification Statement: The patient will continue to require additional inpatient hospital stay due to continued IV Ketamine at 0 6mg/kg/hr       Discharge Plan: Defer to primary team      Code Status: Level 1 - Full Code    Subjective:    Continued complaint of headache this morning, described as a non-radiating dull/ache, 4 5/10, improved in comparison to yesterday's exam  He endorsed a sensation of diffuse "tingliness" and "restlessness"; likely secondary to Ketamine  No other complaints stated at time of exam  Patient denied prior complaint of lightheadedness/dizziness  He denied shortness of breath and chest pain/pressure/paliptations and nausea/vomiting/diarrhea  No focality on neuro exam    Objective:     Vitals:   Temp (24hrs), Av 5 °F (36 9 °C), Min:98 1 °F (36 7 °C), Max:98 8 °F (37 1 °C)    Temp:  [98 1 °F (36 7 °C)-98 8 °F (37 1 °C)] 98 4 °F (36 9 °C)  HR:  [] 106  Resp:  [15-22] 17  BP: (119-163)/() 146/94  SpO2:  [94 %-98 %] 96 %  Body mass index is 22 59 kg/m²  Input and Output Summary (last 24 hours): Intake/Output Summary (Last 24 hours) at 2019 1506  Last data filed at 2019 1127  Gross per 24 hour   Intake 1301 65 ml   Output --   Net 1301 65 ml       Physical Exam:     Physical Exam   Constitutional: He is oriented to person, place, and time  Vital signs are normal  He appears well-developed and well-nourished  He is cooperative  He does not appear ill  No distress  HENT:   Head: Normocephalic and atraumatic  Eyes: Pupils are equal, round, and reactive to light  EOM are normal    Neck: Normal range of motion  Neck supple  Cardiovascular: Normal rate, regular rhythm, normal heart sounds, intact distal pulses and normal pulses  Exam reveals no friction rub  No murmur heard  Pulmonary/Chest: Effort normal and breath sounds normal  No respiratory distress  He has no decreased breath sounds  Abdominal: Soft  Normal appearance and bowel sounds are normal  He exhibits no distension and no abdominal bruit  There is no tenderness  There is no rigidity and no rebound  Musculoskeletal: Normal range of motion  He exhibits no edema  Neurological: He is alert and oriented to person, place, and time  Skin: Skin is warm, dry and intact  Capillary refill takes less than 2 seconds  He is not diaphoretic     Psychiatric: He has a normal mood and affect  His speech is normal and behavior is normal  Judgment and thought content normal    Nursing note and vitals reviewed  Neurologic Exam   Mental Status:  Alert and oriented x4  Able to adhere to commands  Appropriate attention and concentration  Regular rate and rhythm regarding speech  Cranial Nerves:  CN 2 - 12 intact  Horizontal nystagmus noted; likely secondary to Ketamine  Motor Exam:  Muscle bulk: normal  Overall muscle tone: normal  Strength: 5/5 throughout  Motor: able to achieve fine motor movements  Finger-nose-finger intact  Sensory Exam:  Light touch intact  Vibration/proprioception intact  Gait, Coordination, and Reflexes   Reflexes:  Right brachioradialis: 2/4  Left brachioradialis: 2/4  Right biceps: 2/4  Left biceps: 2/4  Right triceps: 2/4  Left triceps: 2/4  Right patellar: 2/4  Left patellar: 2/4  Right achilles: 2/4  Left achilles: 2/4  Babinski: absent  Clonus: absent  Gait: deferred  Coordination: appropriate heel-to-shin  Additional Data:     Labs:    Results from last 7 days   Lab Units 08/27/19  0533   WBC Thousand/uL 6 28   HEMOGLOBIN g/dL 15 1   HEMATOCRIT % 46 1   PLATELETS Thousands/uL 214     Results from last 7 days   Lab Units 08/29/19  0622 08/27/19  0533   POTASSIUM mmol/L 4 2 4 4   CHLORIDE mmol/L 111* 107   CO2 mmol/L 29 31   BUN mg/dL 11 15   CREATININE mg/dL 0 83 0 89   CALCIUM mg/dL 8 7 9 0   ALK PHOS U/L  --  48   ALT U/L  --  18   AST U/L  --  11           * I Have Reviewed All Lab Data Listed Above  * Additional Pertinent Lab Tests Reviewed: All Labs For Current Hospital Admission Reviewed    Imaging:    Imaging Reports Reviewed Today Include: None  Imaging Personally Reviewed by Myself Includes:  None       Recent Cultures (last 7 days):           Last 24 Hours Medication List:     Current Facility-Administered Medications:  baclofen 10 mg Oral TID Nain Llamas MD    diphenhydrAMINE 25 mg Oral Q6H PRN Shadi Ritter MD Mode    divalproex sodium 500 mg Oral Q12H [facility], MD    gabapentin 300 mg Oral TID Roya Ratliff MD    glycopyrrolate 0 2 mg Intravenous Q4H PRN Cassandra Devarinti, DO    haloperidol lactate 2 mg Intramuscular Q8H PRN Cassandra Devarinti, DO    ketamine 0 6 mg/kg/hr Intravenous Continuous Cassandra Devarinti, DO Last Rate: 0 6 mg/kg/hr (08/29/19 1127)   loratadine 10 mg Oral Daily [facility] MD Mode    LORazepam 1 mg Intravenous Q6H PRN Cassandra Devarinti, DO    magnesium gluconate 1,000 mg Oral BID Roya Ratliff MD    melatonin 3 mg Oral HS Roya Ratliff MD    OLANZapine 5 mg Oral HS Roya Ratliff MD    ondansetron 4 mg Intravenous Q6H PRN Roya Ratliff MD    venlafaxine 75 mg Oral HS Delia Julio PA-C         Today, Patient Was Seen By: Jacquelene Cornea Holter, DO    ** Please Note: Dragon 360 Dictation voice to text software may have been used in the creation of this document   **

## 2019-08-29 NOTE — PLAN OF CARE
Problem: PAIN - ADULT  Goal: Verbalizes/displays adequate comfort level or baseline comfort level  Description  Interventions:  - Encourage patient to monitor pain and request assistance  - Assess pain using appropriate pain scale (0-10)  - Administer analgesics based on type and severity of pain and evaluate response  - Implement non-pharmacological measures as appropriate and evaluate response  - Notify physician/advanced practitioner if interventions unsuccessful or patient reports new pain    Outcome: Progressing     Problem: SAFETY ADULT  Goal: Patient will remain free of falls  Description  INTERVENTIONS:  - Assess patient frequently for physical needs  -  Identify cognitive and physical deficits and behaviors that affect risk of falls    -  [place] fall precautions as indicated by assessment   - Educate patient/family on patient safety including physical limitations  - Instruct patient to call for assistance with activity based on assessment  - Modify environment to reduce risk of injury  - Consider OT/PT consult to assist with strengthening/mobility  Outcome: Progressing  Goal: Maintain or return to baseline ADL function  Description  INTERVENTIONS:  -  Assess patient's ability to carry out ADLs; assess patient's baseline for ADL function and identify physical deficits which impact ability to perform ADLs (bathing, care of mouth/teeth, toileting, grooming, dressing, etc )  - Assess/evaluate cause of self-care deficits   - Assess range of motion  - Assess patient's mobility; develop plan if impaired  - Assess patient's need for assistive devices and provide as appropriate  - Encourage maximum independence but intervene and supervise when necessary  - Involve family in performance of ADLs  - Assess for home care needs following discharge   - Consider OT consult to assist with ADL evaluation and planning for discharge  - Provide patient education as appropriate  Outcome: Progressing  Goal: Maintain or return mobility status to optimal level  Description  INTERVENTIONS:  - Assess patient's baseline mobility status (ambulation, transfers, stairs, etc )    - Identify cognitive and physical deficits and behaviors that affect mobility  - Identify mobility aids required to assist with transfers and/or ambulation (gait belt, sit-to-stand, lift, walker, cane, etc )  - [place] fall precautions as indicated by assessment  - Record patient progress and toleration of activity level on Mobility SBAR; progress patient to next Phase/Stage  - Instruct patient to call for assistance with activity based on assessment  - Consider rehabilitation consult to assist with strengthening/weightbearing, etc   Outcome: Progressing     Problem: DISCHARGE PLANNING  Goal: Discharge to home or other facility with appropriate resources  Description  INTERVENTIONS:  - Identify barriers to discharge w/patient and caregiver  - Arrange for needed discharge resources and transportation as appropriate  - Identify discharge learning needs (meds, wound care, etc )  - Refer to Case Management Department for coordinating discharge planning if the patient needs post-hospital services based on physician/advanced practitioner order or complex needs related to functional status, cognitive ability, or social support system   Outcome: Progressing     Problem: Knowledge Deficit  Goal: Patient/family/caregiver demonstrates understanding of disease process, treatment plan, medications, and discharge instructions  Description  Complete learning assessment and assess knowledge base  Interventions:  - Provide teaching at level of understanding  - Provide teaching via preferred learning methods  Outcome: Progressing     Problem: Potential for Falls  Goal: Patient will remain free of falls  Description  INTERVENTIONS:  - Assess patient frequently for physical needs  -  Identify cognitive and physical deficits and behaviors that affect risk of falls    -  [place] fall precautions as indicated by assessment   - Educate patient/family on patient safety including physical limitations  - Instruct patient to call for assistance with activity based on assessment  - Modify environment to reduce risk of injury  - Consider OT/PT consult to assist with strengthening/mobility  Outcome: Progressing     Problem: NEUROSENSORY - ADULT  Goal: Achieves stable or improved neurological status  Description  INTERVENTIONS  - Monitor and report changes in neurological status  - Monitor vital signs such as temperature, blood pressure, glucose, and any other labs ordered   - Monitor for seizure activity and implement precautions if appropriate       Outcome: Progressing

## 2019-08-29 NOTE — ASSESSMENT & PLAN NOTE
· Elective admission for Ketamine therapy per [facility] specialist Dr Marily Rodriguez  · [address] Neurology recommendations  · Patient still complains of  5/10 headache this morning  · Continue ketamine drip, increased today by Neurology  Neurology may be considering IV Depacon when they start weaning ketamine  Follow up Neurology recommendations  Appreciate their assistance  · While on ketamine drip, continue Haldol p r n , Ativan p r n , and Glycopyrrolate p r n    · Continue telemetry while on ketamine   · Continue Depakote and gabapentin  · No opiates are indicated at this time (thought to be component of rebound headache from chronic continuous use of opiates)

## 2019-08-29 NOTE — PROGRESS NOTES
[facility]  Progress Note - Simona Bass 1955, 60 y o  male MRN: [id number]    Unit/Bed#: [facility] 709-01 Encounter: [id number]    Primary Care Provider: Thelma Reardon DO   Date and time admitted to hospital: 8/26/2019  3:42 PM      * Chronic migraine without aura without status migrainosus, not intractable  Assessment & Plan  · Elective admission for Ketamine therapy per [facility] specialist Dr Veronika Riggs  · [address] Neurology recommendations  · Patient still complains of  5/10 headache this morning  · Continue ketamine drip, increased today by Neurology  Neurology may be considering IV Depacon when they start weaning ketamine  Follow up Neurology recommendations  Appreciate their assistance  · While on ketamine drip, continue Haldol p r n , Ativan p r n , and Glycopyrrolate p r n  · Continue telemetry while on ketamine   · Continue Depakote and gabapentin  · No opiates are indicated at this time (thought to be component of rebound headache from chronic continuous use of opiates)    Opioid dependence (HCC)  Assessment & Plan  · In setting of chronic pain as evidence by chronic continuous use of opioids, requiring continuation of home duragesic patch  · Plan as outlined above      VTE Pharmacologic Prophylaxis:   Pharmacologic: low risk  Mechanical VTE Prophylaxis in Place: Yes    Patient Centered Rounds: I have performed bedside rounds with nursing staff today  Discussions with Specialists or Other Care Team Provider: RN    Education and Discussions with Family / Patient: patient    Time Spent for Care: 20 minutes  More than 50% of total time spent on counseling and coordination of care as described above      Current Length of Stay: 3 day(s)    Current Patient Status: Inpatient   Certification Statement: The patient will continue to require additional inpatient hospital stay due to intractable migraine on ketamine gtt    Discharge Plan: when off ketamine gtt/cleared by neurology     Code Status: Level 1 - Full Code      Subjective:   Patient reports to 5/10 HA today  Objective:     Vitals:   Temp (24hrs), Av 7 °F (37 1 °C), Min:98 1 °F (36 7 °C), Max:99 °F (37 2 °C)    Temp:  [98 1 °F (36 7 °C)-99 °F (37 2 °C)] 98 4 °F (36 9 °C)  HR:  [] 87  Resp:  [15-22] 18  BP: (119-163)/() 154/95  SpO2:  [94 %-98 %] 96 %  Body mass index is 22 59 kg/m²  Input and Output Summary (last 24 hours): Intake/Output Summary (Last 24 hours) at 2019 1202  Last data filed at 2019 1015  Gross per 24 hour   Intake 1661 65 ml   Output --   Net 1661 65 ml       Physical Exam:     Physical Exam   Constitutional: He is oriented to person, place, and time  No distress  HENT:   Head: Normocephalic and atraumatic  Eyes: EOM are normal  No scleral icterus  Neck: Normal range of motion  Neck supple  Cardiovascular: Normal rate and regular rhythm  Pulmonary/Chest: Effort normal and breath sounds normal  No respiratory distress  He has no wheezes  Abdominal: Soft  Bowel sounds are normal  He exhibits no distension  There is no tenderness  Musculoskeletal: Normal range of motion  He exhibits no edema  Neurological: He is alert and oriented to person, place, and time  Skin: He is not diaphoretic  Vitals reviewed  Additional Data:     Labs:    Results from last 7 days   Lab Units 19  0533   WBC Thousand/uL 6 28   HEMOGLOBIN g/dL 15 1   HEMATOCRIT % 46 1   PLATELETS Thousands/uL 214     Results from last 7 days   Lab Units 19  0622 19  0533   SODIUM mmol/L 145 142   POTASSIUM mmol/L 4 2 4 4   CHLORIDE mmol/L 111* 107   CO2 mmol/L 29 31   BUN mg/dL 11 15   CREATININE mg/dL 0 83 0 89   ANION GAP mmol/L 5 4   CALCIUM mg/dL 8 7 9 0   ALBUMIN g/dL  --  3 5   TOTAL BILIRUBIN mg/dL  --  0 40   ALK PHOS U/L  --  48   ALT U/L  --  18   AST U/L  --  11   GLUCOSE RANDOM mg/dL 101 99                           * I Have Reviewed All Lab Data Listed Above    * Additional Pertinent Lab Tests Reviewed: All Labs Within Last 24 Hours Reviewed    Imaging:    Imaging Reports Reviewed Today Include: none  Imaging Personally Reviewed by Myself Includes:  none    Recent Cultures (last 7 days):           Last 24 Hours Medication List:     Current Facility-Administered Medications:  baclofen 10 mg Oral TID Arabella Elliott MD    diphenhydrAMINE 25 mg Oral Q6H PRN Arabella Elliott MD    divalproex sodium 500 mg Oral Q12H [address] Yvonne Coello MD    gabapentin 300 mg Oral TID Arabella Elliott MD    glycopyrrolate 0 2 mg Intravenous Q4H PRN Cassandra Devarinti, DO    haloperidol lactate 2 mg Intramuscular Q8H PRN Cassandra Devarinti, DO    ketamine 0 6 mg/kg/hr Intravenous Continuous Cassandra Devarinti, DO Last Rate: 0 6 mg/kg/hr (08/29/19 1127)   loratadine 10 mg Oral Daily Yvonne Coello MD    LORazepam 1 mg Intravenous Q6H PRN Cassandra Devarinti, DO    magnesium gluconate 1,000 mg Oral BID Arabella Elliott MD    melatonin 3 mg Oral HS Arabella Elliott MD    OLANZapine 5 mg Oral HS Arabella Elliott MD    ondansetron 4 mg Intravenous Q6H PRN Arabella Elliott MD    venlafaxine 75 mg Oral HS Ashely Solano PA-C         Today, Patient Was Seen By: Domo Engel PA-C    ** Please Note: Dictation voice to text software may have been used in the creation of this document   **

## 2019-08-29 NOTE — PROGRESS NOTES
Review of Systems  Prior Auth received via covermymeds from pharmacy for pt BOTOX 200units, submitted key no  [id number]

## 2019-08-30 VITALS
WEIGHT: 162 LBS | RESPIRATION RATE: 18 BRPM | DIASTOLIC BLOOD PRESSURE: 97 MMHG | HEIGHT: 71 IN | SYSTOLIC BLOOD PRESSURE: 148 MMHG | BODY MASS INDEX: 22.68 KG/M2 | OXYGEN SATURATION: 97 % | HEART RATE: 101 BPM | TEMPERATURE: 99 F

## 2019-08-30 PROBLEM — G43.709 CHRONIC MIGRAINE WITHOUT AURA WITHOUT STATUS MIGRAINOSUS, NOT INTRACTABLE: Status: RESOLVED | Noted: 2019-02-21 | Resolved: 2019-08-30

## 2019-08-30 PROCEDURE — 99232 SBSQ HOSP IP/OBS MODERATE 35: CPT | Performed by: PSYCHIATRY & NEUROLOGY

## 2019-08-30 PROCEDURE — 99239 HOSP IP/OBS DSCHRG MGMT >30: CPT | Performed by: NURSE PRACTITIONER

## 2019-08-30 RX ORDER — DIVALPROEX SODIUM 500 MG/1
1000 TABLET, DELAYED RELEASE ORAL
Qty: 60 TABLET | Refills: 0 | Status: SHIPPED | OUTPATIENT
Start: 2019-08-30 | End: 2019-09-23 | Stop reason: ALTCHOICE

## 2019-08-30 RX ORDER — DIVALPROEX SODIUM 500 MG/1
500 TABLET, DELAYED RELEASE ORAL DAILY
Qty: 30 TABLET | Refills: 0 | Status: ON HOLD | OUTPATIENT
Start: 2019-08-30 | End: 2019-10-31 | Stop reason: SDUPTHER

## 2019-08-30 RX ORDER — KETOROLAC TROMETHAMINE 30 MG/ML
30 INJECTION, SOLUTION INTRAMUSCULAR; INTRAVENOUS EVERY 6 HOURS SCHEDULED
Status: COMPLETED | OUTPATIENT
Start: 2019-08-30 | End: 2019-08-30

## 2019-08-30 RX ADMIN — VALPROATE SODIUM 1000 MG: 100 INJECTION, SOLUTION INTRAVENOUS at 13:30

## 2019-08-30 RX ADMIN — BACLOFEN 10 MG: 10 TABLET ORAL at 16:18

## 2019-08-30 RX ADMIN — KETOROLAC TROMETHAMINE 30 MG: 30 INJECTION, SOLUTION INTRAMUSCULAR at 13:27

## 2019-08-30 RX ADMIN — LORATADINE 10 MG: 10 TABLET ORAL at 08:31

## 2019-08-30 RX ADMIN — Medication 0.5 MG/KG/HR: at 07:31

## 2019-08-30 RX ADMIN — DIVALPROEX SODIUM 500 MG: 500 TABLET, DELAYED RELEASE ORAL at 08:31

## 2019-08-30 RX ADMIN — Medication 1000 MG: at 08:32

## 2019-08-30 RX ADMIN — GABAPENTIN 300 MG: 300 CAPSULE ORAL at 16:18

## 2019-08-30 RX ADMIN — BACLOFEN 10 MG: 10 TABLET ORAL at 08:31

## 2019-08-30 RX ADMIN — GABAPENTIN 300 MG: 300 CAPSULE ORAL at 08:31

## 2019-08-30 RX ADMIN — Medication 0.5 MG/KG/HR: at 01:20

## 2019-08-30 RX ADMIN — Medication 1000 MG: at 16:18

## 2019-08-30 RX ADMIN — KETOROLAC TROMETHAMINE 30 MG: 30 INJECTION, SOLUTION INTRAMUSCULAR at 18:04

## 2019-08-30 RX ADMIN — VALPROATE SODIUM 1000 MG: 100 INJECTION, SOLUTION INTRAVENOUS at 18:04

## 2019-08-30 NOTE — DISCHARGE INSTRUCTIONS
Insomnia   WHAT YOU NEED TO KNOW:   Insomnia is a condition that makes it hard to fall or stay asleep  Lack of sleep can lead to attention or memory problems during the day  You may also be doherty, depressed, clumsy, or have headaches  DISCHARGE INSTRUCTIONS:   Contact your healthcare provider if:   · Your symptoms do not get better, or they get worse  · You begin to use drugs or alcohol to fall asleep  · You have questions or concerns about your condition or care  Medicines:   · Medicines  may help you sleep more regularly or help you feel less anxious  · Take your medicine as directed  Contact your healthcare provider if you think your medicine is not helping or if you have side effects  Tell him or her if you are allergic to any medicine  Keep a list of the medicines, vitamins, and herbs you take  Include the amounts, and when and why you take them  Bring the list or the pill bottles to follow-up visits  Carry your medicine list with you in case of an emergency  What you can do to improve your sleep:   · Create a sleep schedule  This will help you form a sleep routine  Keep a record of your sleep patterns, and any sleeping problems you have  Bring the record to follow-up visits with healthcare providers  · Do not take naps  Naps could make it hard for you to fall asleep at bedtime  · Keep your bedroom cool, quiet, and dark  Turn on white noise, such as a fan, to help you relax  Do not use your bed for any activity that will keep you awake  Do not read, exercise, eat, or watch TV in your bedroom  · Get up if you do not fall asleep within 20 minutes  Move to another room and do something relaxing until you become sleepy  · Limit caffeine, alcohol, and food to earlier in the day  Only drink caffeine in the morning  Do not drink alcohol within 6 hours of bedtime  Do not eat a heavy meal right before you go to bed  · Exercise regularly  Daily exercise may help you sleep better   Do not exercise within 4 hours of bedtime  Follow up with your healthcare provider as directed: Your healthcare provider may refer you for cognitive behavioral therapy  A behavioral therapist may help you find ways to relax, decrease stress, and improve sleep  Write down your questions so you remember to ask them during your visits  © 2017 [address] Information is for End User's use only and may not be sold, redistributed or otherwise used for commercial purposes  All illustrations and images included in CareNotes® are the copyrighted property of [employer]  or [employer]  The above information is an  only  It is not intended as medical advice for individual conditions or treatments  Talk to your doctor, nurse or pharmacist before following any medical regimen to see if it is safe and effective for you  Migraine Headache   WHAT YOU SHOULD KNOW:   A migraine is a severe headache  The pain can be so severe that it interferes with your daily activities  A migraine can last a few hours up to several days  The exact cause of migraines is not known  It may be caused by changes in your body chemicals and extra sensitive nerves in your brain  AFTER YOU LEAVE:   Medicines:  Take medicine as soon as you feel a migraine begin  · Pain medicine: You may need medicine to take away or decrease pain  You may need a doctor's order for this medicine  Do not wait until the pain is severe before you take your medicine  · Migraine medicines: These are used to help prevent a migraine or stop it once it starts  · Antinausea medicine: This medicine may be given to calm your stomach and to help prevent vomiting  They can also help relieve pain  · Take your medicine as directed  Call your healthcare provider if you think your medicine is not helping or if you have side effects  Tell him if you are allergic to any medicine   Keep a list of the medicines, vitamins, and herbs you take  Include the amounts, and when and why you take them  Bring the list or the pill bottles to follow-up visits  Carry your medicine list with you in case of an emergency  Manage your symptoms:   · Rest:  Rest in a dark, quiet room  This will help decrease your pain  · Ice:  Ice helps decrease pain  Use an ice pack or put crushed ice in a plastic bag  Cover the ice pack with a towel and place it on your head where it hurts for 15 to 20 minutes every hour  · Heat:  Heat helps decrease pain and muscle spasms  Use a small towel dampened with warm water or a heating pad, or sit in a warm bath  Apply heat on the area for 20 to 30 minutes every 2 hours  You may alternate heat and ice  Keep a headache diary:  Write down when your migraines start and stop  Include your symptoms and what you were doing when a migraine began  Record what you ate or drank for 24 hours before the migraine started  Describe the pain and where it hurts  Keep track of what you did to treat your migraine and whether it worked  Follow up with your primary healthcare provider or neurologist as directed:  Bring your headache diary with you when you see your primary healthcare provider  Write down your questions so you remember to ask them during your visits  Prevent another migraine:   · Do not smoke: If you smoke, it is never too late to quit  Tobacco smoke can trigger a migraine  It can also cause heart disease, lung disease, cancer, and other health problems  Quitting smoking will improve your health and the health of those around you  If you smoke, ask for information about how to stop  · Do not drink alcohol:  Alcohol can trigger a migraine  It can also interfere with the medicines used to treat your migraine  · Get regular exercise:  Exercise may help prevent migraines  Talk to your primary healthcare provider about the best exercise plan for you  · Manage stress:  Stress may trigger a migraine   Learn new ways to relax, such as deep breathing  · Stick to a sleep schedule:  Go to bed and get up at the same time each day  · Eat regular meals:  Include healthy foods such as include fruit, vegetables, whole-grain breads, low-fat dairy products, beans, lean meat, and fish  Avoid trigger foods like chocolate, hard cheese, and red wine  Foods that contain gluten, nitrates, MSG, or artificial sweeteners may also trigger migraines  Caffeine, which is often used to treat migraines, can also trigger them  Contact your primary healthcare provider or neurologist if:   · You have a fever  · Your migraines interfere with your daily activities  · Your medicines or treatments stop working  · You have questions about your condition or care  Seek care immediately or call 911 if:   · You have a headache that seems different or much worse than your usual migraine headache  · You have a severe headache with a fever or a stiff neck  · You have new problems with speech, vision, balance, or movement  · You feel like you are going to faint, you become confused, or you have a seizure  © 2014 [address] is for End User's use only and may not be sold, redistributed or otherwise used for commercial purposes  All illustrations and images included in CareNotes® are the copyrighted property of [employer] M [employer] Inc  or [employer]  The above information is an  only  It is not intended as medical advice for individual conditions or treatments  Talk to your doctor, nurse or pharmacist before following any medical regimen to see if it is safe and effective for you

## 2019-08-30 NOTE — PROGRESS NOTES
NEUROLOGY Consultation Progress Note  Service: Neurology  Patient: Jose Roca 60 y o  male   MRN: [id number]  PCP: Aman Jason DO  [facility] Encounter: [id number]  Date Of Visit: 08/30/19    Assessment/Plan:  1  Chronic migraine w/out aura w/out status migrainosus, not intractable  · Continue taper of IV ketamine 250 mg in preparation for discharge  · Continue IV Depacon 1000 mg Q6h; consider increase of p o  depakote dose 500 TID or 500/1000 for continued prophylaxis  · Continue telemetry  · Continue Haldol 2 mg q8h PRN for severe agitation, hallucinations  · Continue Ativan 1mg q6h PRN for agitation and anxiety  · Continue Glycopyrrolate 0 2 mg q4h PRN for secretions  2  Chronic, continuous use of opioids  · Continue to monitor vitals and symptomatology secondary to discontinuation of opioids  · Discontinued Fentanyl 25 mcg/hr every 3rd day  · Discontinued Norco 5/325 q4h PRN  3  Insomnia  · Continue Zyprexa 5 mg HS  VTE Pharmacologic Prophylaxis:   Pharmacologic: Low VTE score  Mechanical VTE Prophylaxis in Place: No    Patient Centered Rounds: I have performed bedside rounds with nursing staff today  Discussions with Specialists or Other Care Team Provider: SLIM  Education and Discussions with Family / Patient: Patient  Time Spent for Care: 30 minutes  More than 50% of total time spent on counseling and coordination of care as described above  Current Length of Stay: 4 day(s)    Current Patient Status: Inpatient   Certification Statement: The patient, who was admitted as an inpatient, is being discharged sooner than originally anticipated due to: discontinuation of IV Ketamine/Depacon      Discharge Plan: Defer to primary team      Code Status: Level 1 - Full Code    Subjective:    Continued complaint of headache this morning, described as a non-radiating dull/ache, 3/10, improved in comparison to yesterday's exam  He denied previous sensation of diffuse "tingliness" and "restlessness"; likely secondary to Ketamine  No other complaints stated at time of exam  Patient denied lightheadedness/dizziness, shortness of breath and chest pain/pressure/paliptations and nausea/vomiting/diarrhea  No focality on neuro exam    Objective:     Vitals:   Temp (24hrs), Av 8 °F (37 1 °C), Min:98 6 °F (37 °C), Max:99 °F (37 2 °C)    Temp:  [98 6 °F (37 °C)-99 °F (37 2 °C)] 98 6 °F (37 °C)  HR:  [] 89  Resp:  [17-20] 18  BP: (131-168)/() 140/97  SpO2:  [94 %-99 %] 96 %  Body mass index is 22 59 kg/m²  Input and Output Summary (last 24 hours): Intake/Output Summary (Last 24 hours) at 2019 1106  Last data filed at 2019 0800  Gross per 24 hour   Intake 565 ml   Output --   Net 565 ml       Physical Exam:     Physical Exam   Constitutional: He is oriented to person, place, and time  Vital signs are normal  He appears well-developed and well-nourished  He is cooperative  He does not appear ill  No distress  HENT:   Head: Normocephalic and atraumatic  Eyes: Pupils are equal, round, and reactive to light  EOM are normal    Neck: Normal range of motion  Neck supple  Cardiovascular: Normal rate, regular rhythm, normal heart sounds, intact distal pulses and normal pulses  Exam reveals no friction rub  No murmur heard  Pulmonary/Chest: Effort normal and breath sounds normal  No respiratory distress  He has no decreased breath sounds  Abdominal: Soft  Normal appearance and bowel sounds are normal  He exhibits no distension and no abdominal bruit  There is no tenderness  There is no rigidity and no rebound  Musculoskeletal: Normal range of motion  He exhibits no edema  Neurological: He is alert and oriented to person, place, and time  Skin: Skin is warm, dry and intact  Capillary refill takes less than 2 seconds  He is not diaphoretic  Psychiatric: He has a normal mood and affect   His speech is normal and behavior is normal  Judgment and thought content normal    Nursing note and vitals reviewed  Neurologic Exam   Mental Status:  Alert and oriented x4  Able to adhere to commands  Appropriate attention and concentration  Regular rate and rhythm regarding speech  Cranial Nerves:  CN 2 - 12 intact  Motor Exam:  Muscle bulk: normal  Overall muscle tone: normal  Strength: 5/5 throughout  Motor: able to achieve fine motor movements  Finger-nose-finger intact  Sensory Exam:  Light touch intact  Vibration/proprioception intact  Gait, Coordination, and Reflexes   Reflexes:  Right brachioradialis: 2/4  Left brachioradialis: 2/4  Right biceps: 2/4  Left biceps: 2/4  Right triceps: 2/4  Left triceps: 2/4  Right patellar: 2/4  Left patellar: 2/4  Right achilles: 2/4  Left achilles: 2/4  Babinski: absent  Clonus: absent  Gait: deferred  Coordination: appropriate heel-to-shin  Additional Data:     Labs:    Results from last 7 days   Lab Units 08/27/19  0533   WBC Thousand/uL 6 28   HEMOGLOBIN g/dL 15 1   HEMATOCRIT % 46 1   PLATELETS Thousands/uL 214     Results from last 7 days   Lab Units 08/29/19  0622 08/27/19  0533   POTASSIUM mmol/L 4 2 4 4   CHLORIDE mmol/L 111* 107   CO2 mmol/L 29 31   BUN mg/dL 11 15   CREATININE mg/dL 0 83 0 89   CALCIUM mg/dL 8 7 9 0   ALK PHOS U/L  --  48   ALT U/L  --  18   AST U/L  --  11           * I Have Reviewed All Lab Data Listed Above  * Additional Pertinent Lab Tests Reviewed: All Labs For Current Hospital Admission Reviewed    Imaging:    Imaging Reports Reviewed Today Include: None  Imaging Personally Reviewed by Myself Includes:  None       Recent Cultures (last 7 days):           Last 24 Hours Medication List:     Current Facility-Administered Medications:  baclofen 10 mg Oral TID Jack Angulo MD    diphenhydrAMINE 25 mg Oral Q6H PRN Jack Angulo MD    divalproex sodium 500 mg Oral Q12H [facility] Jack Angulo MD    gabapentin 300 mg Oral TID [facility] MD Mode    glycopyrrolate 0 2 mg Intravenous Q4H PRN Cassandra Hull DO    haloperidol lactate 2 mg Intramuscular Q8H PRN Cassandra Hull, DO    ketamine 0 3 mg/kg/hr Intravenous Continuous Jordan C Holter, DO Last Rate: 0 4 mg/kg/hr (08/30/19 0943)   loratadine 10 mg Oral Daily Yvonne Coello MD    LORazepam 1 mg Intravenous Q6H PRN Cassandra Hull DO    magnesium gluconate 1,000 mg Oral BID Epifanio Terrazas MD    melatonin 3 mg Oral HS Yvonne Coello MD    OLANZapine 5 mg Oral HS Yvonne Coello MD    ondansetron 4 mg Intravenous Q6H PRN Epifanio Terrazas MD    venlafaxine 75 mg Oral HS Wang Ward PA-C         Today, Patient Was Seen By: Horacio Slocumb Holter, DO    ** Please Note: Dragon 360 Dictation voice to text software may have been used in the creation of this document   **
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
## 2019-09-05 ENCOUNTER — TELEPHONE (OUTPATIENT)
Dept: NEUROLOGY | Facility: CLINIC | Age: 64
End: 2019-09-05

## 2019-09-05 ENCOUNTER — OFFICE VISIT (OUTPATIENT)
Dept: NEUROLOGY | Facility: CLINIC | Age: 64
End: 2019-09-05
Payer: COMMERCIAL

## 2019-09-05 VITALS
SYSTOLIC BLOOD PRESSURE: 162 MMHG | HEART RATE: 96 BPM | DIASTOLIC BLOOD PRESSURE: 78 MMHG | BODY MASS INDEX: 21.67 KG/M2 | HEIGHT: 72 IN | WEIGHT: 160 LBS

## 2019-09-05 DIAGNOSIS — G47.39 MIXED SLEEP APNEA: ICD-10-CM

## 2019-09-05 DIAGNOSIS — F11.288 OPIOID DEPENDENCE WITH OTHER OPIOID-INDUCED DISORDER (HCC): Chronic | ICD-10-CM

## 2019-09-05 DIAGNOSIS — G43.719 INTRACTABLE CHRONIC MIGRAINE WITHOUT AURA AND WITHOUT STATUS MIGRAINOSUS: Primary | ICD-10-CM

## 2019-09-05 DIAGNOSIS — G43.709 CHRONIC MIGRAINE WITHOUT AURA WITHOUT STATUS MIGRAINOSUS, NOT INTRACTABLE: ICD-10-CM

## 2019-09-05 PROCEDURE — 99215 OFFICE O/P EST HI 40 MIN: CPT | Performed by: PHYSICIAN ASSISTANT

## 2019-09-05 RX ORDER — GABAPENTIN 300 MG/1
300 CAPSULE ORAL 4 TIMES DAILY
Qty: 360 CAPSULE | Refills: 1 | Status: SHIPPED | OUTPATIENT
Start: 2019-09-05 | End: 2020-04-08 | Stop reason: ALTCHOICE

## 2019-09-05 RX ORDER — IBUPROFEN 200 MG
800 TABLET ORAL AS NEEDED
COMMUNITY
End: 2019-10-01 | Stop reason: ALTCHOICE

## 2019-09-05 RX ORDER — QUETIAPINE FUMARATE 25 MG/1
25 TABLET, FILM COATED ORAL
Qty: 30 TABLET | Refills: 6 | Status: SHIPPED | OUTPATIENT
Start: 2019-09-05 | End: 2019-10-01 | Stop reason: SDUPTHER

## 2019-09-05 NOTE — TELEPHONE ENCOUNTER
General 09/04/2019  1:21 PM Jesse Grant MA CARE COODINATION -   Note    BOTOX 200 UNITS (LAST VISIT) AUTH# [id number] 2 VISITS, AV 06/06/19 TILL 12/06/19    CARE SITE

## 2019-09-05 NOTE — PATIENT INSTRUCTIONS
Preventive therapy for headaches:   -Over-the-counter supplements: to decrease intensity and frequency of migraines  - Magnesium Oxide 400mg twice a day   If any diarrhea or upset stomach, decrease dose  as tolerated  -  Vitamin B2 200 mg twice a day  This supplement will change the color of the urine to fluorescent yellow no matter how hydrated, which is normal    - Botox for chronic daily headaches  - Baclofen 10mg three times a day  - Venlafaxine 37 5 mg in the am  - Gabapentin 300 mg 4 times a day   - Depakote 500 mg in am and 1000 mg bedtime  - STOP olanzapine 5 mg at bedtime daily    -Start Seroquel 25 mg at bedtime  -Abortive therapy for headaches:   - Nothing at this time    If pain goes to a 6 or 7 may take an extra gabapentin    NO MORE IBUPROFEN  Call us next week with a report

## 2019-09-05 NOTE — ASSESSMENT & PLAN NOTE
Patient has not used any opiods since his ketamine infusion after 20+ years    Will continue to monitor his withdrawal from them

## 2019-09-05 NOTE — ASSESSMENT & PLAN NOTE
Preventive therapy for headaches:   -Over-the-counter supplements: to decrease intensity and frequency of migraines  - Magnesium Oxide 400mg twice a day   If any diarrhea or upset stomach, decrease dose  as tolerated  -  Vitamin B2 200 mg twice a day  This supplement will change the color of the urine to fluorescent yellow no matter how hydrated, which is normal    - Botox for chronic daily headaches  - Baclofen 10mg three times a day  - Venlafaxine 37 5 mg in the am  - Gabapentin 300 mg 4 times a day   - Depakote 500 mg in am and 1000 mg bedtime  - STOP olanzapine 5 mg at bedtime daily    -Start Seroquel 25 mg at bedtime  -Abortive therapy for headaches:   - Nothing at this time    If pain goes to a 6 or 7 may take an extra gabapentin    NO MORE IBUPROFEN

## 2019-09-06 NOTE — TELEPHONE ENCOUNTER
Called [facility] spoke Rimma Knight - botox delivery confirmed for 9/11/19 via [facility] require to [facility] location [address]    Please await botox delivery  Thank you!     Nancy

## 2019-09-11 NOTE — TELEPHONE ENCOUNTER
botox 200 unit vial quantity of one arrived at  location 9/11/19   Botox logged and placed in the fridge

## 2019-09-20 ENCOUNTER — TELEPHONE (OUTPATIENT)
Dept: NEUROLOGY | Facility: CLINIC | Age: 64
End: 2019-09-20

## 2019-09-20 NOTE — TELEPHONE ENCOUNTER
Pt's wife called reported that this last week pt has been in bed all day long, experiencing a lot of pain  He is a  and he was unable to go on Sunday or perform any duties all week  She states when he does sleep, he will fall immediately asleep, but then is up every 2 hours or so after that  States that he was advised to take hydrocodone (by Dr Rach Garcia through personal calls/texts), which he has been using but he is not able to "get on top of the pain"at all  His quality of life is poor right now  States that he is only eating light because of the nausea     Baclofen 10mg three times a day  Venlafaxine 37 5 mg in the am  Gabapentin 300 mg 4 times a day   Depakote 500 mg in am and 1000 mg bedtime  Start Seroquel 25 mg at bedtime  magnesium 400 daily    [phone]

## 2019-09-21 NOTE — PROGRESS NOTES
Called patient and his wife states he is a lot of pain and back to taking opioids 4-6 times a day   Very dizzy and unable to eat  Plan now:   Baclofen 10 mg as needed  Benadryl 25 mg one at bedtime  Depakote 500 mg twice a day  Gabapentin 300 mg 4 times a day as needed and tonight take 300mg to help her sleep  Seroquel 25 mg two tablets at bedtime  Venlafaxine 75mg    His wife will update us daily as to how he is doing

## 2019-09-23 ENCOUNTER — TELEPHONE (OUTPATIENT)
Dept: NEUROLOGY | Facility: CLINIC | Age: 64
End: 2019-09-23

## 2019-09-23 ENCOUNTER — OFFICE VISIT (OUTPATIENT)
Dept: NEUROLOGY | Facility: CLINIC | Age: 64
End: 2019-09-23
Payer: COMMERCIAL

## 2019-09-23 VITALS
SYSTOLIC BLOOD PRESSURE: 108 MMHG | WEIGHT: 168 LBS | DIASTOLIC BLOOD PRESSURE: 74 MMHG | HEART RATE: 106 BPM | BODY MASS INDEX: 22.75 KG/M2 | HEIGHT: 72 IN

## 2019-09-23 DIAGNOSIS — E55.9 VITAMIN D DEFICIENCY: ICD-10-CM

## 2019-09-23 DIAGNOSIS — G43.709 CHRONIC MIGRAINE WITHOUT AURA WITHOUT STATUS MIGRAINOSUS, NOT INTRACTABLE: Primary | ICD-10-CM

## 2019-09-23 PROCEDURE — 99214 OFFICE O/P EST MOD 30 MIN: CPT | Performed by: PSYCHIATRY & NEUROLOGY

## 2019-09-23 RX ORDER — PROCHLORPERAZINE MALEATE 10 MG
TABLET ORAL
Qty: 20 TABLET | Refills: 3 | Status: SHIPPED | OUTPATIENT
Start: 2019-09-23 | End: 2019-11-01 | Stop reason: SDUPTHER

## 2019-09-23 RX ORDER — HYDROCODONE BITARTRATE AND ACETAMINOPHEN 10; 325 MG/1; MG/1
1 TABLET ORAL EVERY 6 HOURS PRN
COMMUNITY

## 2019-09-23 RX ORDER — CLONAZEPAM 0.25 MG/1
TABLET, ORALLY DISINTEGRATING ORAL
Qty: 60 TABLET | Refills: 0 | Status: ON HOLD | OUTPATIENT
Start: 2019-09-23 | End: 2019-10-31 | Stop reason: SDUPTHER
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
## 2019-09-23 NOTE — PATIENT INSTRUCTIONS
Preventive therapy for headaches:   -Stop taking the mag and b2  And only use magnesium as needed for constipation  - keep taking calcium and vitamin-D  - Benadryl 25 mg at bedtime    - Depakote 500 mg twice a day  - Botox for chronic daily headaches  - Baclofen 10mg may stop as he does not think it is helping him  - Venlafaxine 35 7mg one in am  - Gabapentin 300mg four times a day  - Seroquel 50mg at bedtime daily with gabapentin 300mg    -Abortive therapy for headaches:   - Hydrocodone one tab three times a day  May take over hydrocodone if his primary care physician does not given to him    For now continue we may have gotten off of opioids way too fast and caused patient to have significant amount of symptoms   - Klonopin 0 25 mg t i d  P r n  for headache and other associated symptoms   - Compazine 10 mg 3 times a day as needed for headache and nausea

## 2019-09-23 NOTE — TELEPHONE ENCOUNTER
Daniela Bailey from [facility] pharmacy called and states emgality requires PA     rx bin [id number]  pcn [id number]  Group [facility]  Id [id number]  [phone]    PA initiated on CMM, key# [id number]  Awaiting determination

## 2019-09-23 NOTE — PROGRESS NOTES
Review of Systems   Constitutional: Positive for fatigue  HENT: Positive for postnasal drip  Eyes: Positive for visual disturbance  Respiratory: Negative  Cardiovascular: Negative  Gastrointestinal: Positive for nausea  Bloating   Endocrine: Negative  Genitourinary: Negative  Musculoskeletal: Negative  Skin: Negative  Allergic/Immunologic: Negative  Neurological: Positive for dizziness, weakness and headaches   difficulty   Hematological: Negative  Psychiatric/Behavioral: Positive for agitation, decreased concentration and hallucinations
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
## 2019-09-27 NOTE — PROGRESS NOTES
Chemodenervation  Date/Time: 10/2/2019 2:48 PM  Performed by: Lina Barksdale MD  Authorized by: Lina Barksdale MD     Pre-procedure details:     Prepped With: Alcohol      Premedication:  Diazepam  Anesthesia (see MAR for exact dosages): Anesthesia method:  None  Procedure details:     Position:  Upright  Botox:     Botox Type:  Type A    Brand:  Botox    mL's of Botulinum Toxin:  200    Final Concentration per CC:  200 units    Needle Gauge:  30 G 2 5 inch  Total Units:     Total units used:  200    Total units discarded:  0  Post-procedure details:     Chemodenervation:  Chronic migraine    Facial Nerve Location[de-identified]  Bilateral facial nerve    Patient tolerance of procedure:   Tolerated well, no immediate complications      Procedures:     Botox Procedures: chronic headache      Indications: migraines    Injection Location:     Head / Face:  L superior cervical paraspinal, R superior cervical paraspinal, L , R , L frontalis, R frontalis, L medial occipitalis, R medial occipitalis, procerus, R temporalis, L temporalis, R superior trapezius and L superior trapezius    L  injection amount:  5 unit(s)    R  injection amount:  5 unit(s)    L lateral frontalis:  5 unit(s)    R lateral frontalis:  5 unit(s)    L medial frontalis:  5 unit(s)    R medial frontalis:  5 unit(s)    L temporalis injection amount:  20 unit(s)    R temporalis injection amount:  20 unit(s)    Procerus injection amount:  5 unit(s)    L medial occipitalis injection amount:  15 unit(s)    R medial occipitalis injection amount:  15 unit(s)    L superior cervical paraspinal injection amount:  10 unit(s)    R superior cervical paraspinal injection amount:  10 unit(s)    L superior trapezius injection amount:  15 unit(s)    R superior trapezius injection amount:  15 unit(s)  Total Units:     Total units used:  200    Total units discarded:  0  Post-procedure details:     Chemodenervation:  Chronic migraine Facial Nerve Location[de-identified]  Bilateral facial nerve    Patient tolerance of procedure: Tolerated well, no immediate complications     45 units biparietal and bioccipital , which was medically necessary

## 2019-10-01 ENCOUNTER — PROCEDURE VISIT (OUTPATIENT)
Dept: NEUROLOGY | Facility: CLINIC | Age: 64
End: 2019-10-01
Payer: COMMERCIAL

## 2019-10-01 VITALS — TEMPERATURE: 98.1 F | SYSTOLIC BLOOD PRESSURE: 137 MMHG | DIASTOLIC BLOOD PRESSURE: 81 MMHG | HEART RATE: 88 BPM

## 2019-10-01 DIAGNOSIS — G43.709 CHRONIC MIGRAINE WITHOUT AURA WITHOUT STATUS MIGRAINOSUS, NOT INTRACTABLE: Primary | ICD-10-CM

## 2019-10-01 RX ORDER — QUETIAPINE FUMARATE 25 MG/1
TABLET, FILM COATED ORAL
Qty: 60 TABLET | Refills: 6 | Status: SHIPPED | OUTPATIENT
Start: 2019-10-01 | End: 2019-11-01 | Stop reason: SDUPTHER

## 2019-10-02 DIAGNOSIS — G43.709 CHRONIC MIGRAINE WITHOUT AURA WITHOUT STATUS MIGRAINOSUS, NOT INTRACTABLE: Primary | ICD-10-CM

## 2019-10-02 PROCEDURE — 64615 CHEMODENERV MUSC MIGRAINE: CPT | Performed by: PSYCHIATRY & NEUROLOGY

## 2019-10-02 RX ORDER — CYCLOBENZAPRINE HCL 10 MG
TABLET ORAL
Qty: 60 TABLET | Refills: 3 | Status: SHIPPED | OUTPATIENT
Start: 2019-10-02 | End: 2019-11-01 | Stop reason: SDUPTHER
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
## 2019-10-07 ENCOUNTER — OFFICE VISIT (OUTPATIENT)
Dept: NEUROLOGY | Facility: CLINIC | Age: 64
End: 2019-10-07
Payer: COMMERCIAL

## 2019-10-07 VITALS
HEIGHT: 72 IN | SYSTOLIC BLOOD PRESSURE: 147 MMHG | HEART RATE: 103 BPM | BODY MASS INDEX: 23.3 KG/M2 | DIASTOLIC BLOOD PRESSURE: 84 MMHG | WEIGHT: 172 LBS

## 2019-10-07 DIAGNOSIS — F11.288 OPIOID DEPENDENCE WITH OTHER OPIOID-INDUCED DISORDER (HCC): ICD-10-CM

## 2019-10-07 DIAGNOSIS — M54.2 CERVICALGIA: ICD-10-CM

## 2019-10-07 DIAGNOSIS — G43.719 INTRACTABLE CHRONIC MIGRAINE WITHOUT AURA AND WITHOUT STATUS MIGRAINOSUS: ICD-10-CM

## 2019-10-07 DIAGNOSIS — R45.89 DEPRESSED MOOD: ICD-10-CM

## 2019-10-07 DIAGNOSIS — G43.709 CHRONIC MIGRAINE WITHOUT AURA WITHOUT STATUS MIGRAINOSUS, NOT INTRACTABLE: Primary | ICD-10-CM

## 2019-10-07 PROCEDURE — 99214 OFFICE O/P EST MOD 30 MIN: CPT | Performed by: PSYCHIATRY & NEUROLOGY

## 2019-10-07 RX ORDER — CLONAZEPAM 1 MG/1
1 TABLET, ORALLY DISINTEGRATING ORAL
Qty: 30 TABLET | Refills: 0 | Status: SHIPPED | OUTPATIENT
Start: 2019-10-07 | End: 2019-10-31 | Stop reason: HOSPADM

## 2019-10-07 NOTE — PATIENT INSTRUCTIONS
Preventive therapy for headaches:   - magnesium as needed for constipation  - Keep taking calcium and vitamin-D  - Benadryl 25 mg at bedtime    - Depakote 500 mg twice a day  - Botox for chronic daily headaches  - Cyclobenzaprine 10 mg twice a day  - Venlafaxine 75 mg one in am  - Gabapentin 300mg four times a day  - Seroquel 50mg at bedtime   - Klonopin 0 25 mg 1 in a m  And 1 at noon  -Abortive therapy for headaches:   - Hydrocodone two tab three times a day  May take over hydrocodone if his primary care physician does not give him the script      - Klonopin 1mg at bedtime daily  - Compazine 10 mg 3 times a day as needed for headache and nausea

## 2019-10-28 ENCOUNTER — HOSPITAL ENCOUNTER (INPATIENT)
Facility: HOSPITAL | Age: 64
LOS: 3 days | Discharge: HOME | DRG: 054 | End: 2019-10-31
Attending: INTERNAL MEDICINE | Admitting: INTERNAL MEDICINE
Payer: COMMERCIAL

## 2019-10-28 DIAGNOSIS — F11.288 OPIOID DEPENDENCE WITH OTHER OPIOID-INDUCED DISORDER (HCC): ICD-10-CM

## 2019-10-28 DIAGNOSIS — G43.719 INTRACTABLE CHRONIC MIGRAINE WITHOUT AURA AND WITHOUT STATUS MIGRAINOSUS: Primary | ICD-10-CM

## 2019-10-28 DIAGNOSIS — R45.89 DEPRESSED MOOD: ICD-10-CM

## 2019-10-28 DIAGNOSIS — G43.709 CHRONIC MIGRAINE WITHOUT AURA WITHOUT STATUS MIGRAINOSUS, NOT INTRACTABLE: Primary | ICD-10-CM

## 2019-10-28 DIAGNOSIS — F11.288 OPIOID DEPENDENCE WITH OTHER OPIOID-INDUCED DISORDER (HCC): Chronic | ICD-10-CM

## 2019-10-28 DIAGNOSIS — G43.709 CHRONIC MIGRAINE WITHOUT AURA WITHOUT STATUS MIGRAINOSUS, NOT INTRACTABLE: ICD-10-CM

## 2019-10-28 LAB
ALBUMIN SERPL BCP-MCNC: 4.2 G/DL (ref 3.5–5)
ALP SERPL-CCNC: 55 U/L (ref 46–116)
ALT SERPL W P-5'-P-CCNC: 34 U/L (ref 12–78)
ANION GAP SERPL CALCULATED.3IONS-SCNC: 7 MMOL/L (ref 4–13)
AST SERPL W P-5'-P-CCNC: 34 U/L (ref 5–45)
BASOPHILS # BLD AUTO: 0.04 THOUSANDS/ΜL (ref 0–0.1)
BASOPHILS NFR BLD AUTO: 1 % (ref 0–1)
BILIRUB SERPL-MCNC: 0.5 MG/DL (ref 0.2–1)
BUN SERPL-MCNC: 22 MG/DL (ref 5–25)
CALCIUM SERPL-MCNC: 9.3 MG/DL (ref 8.3–10.1)
CHLORIDE SERPL-SCNC: 107 MMOL/L (ref 100–108)
CO2 SERPL-SCNC: 26 MMOL/L (ref 21–32)
CREAT SERPL-MCNC: 1.18 MG/DL (ref 0.6–1.3)
EOSINOPHIL # BLD AUTO: 0.33 THOUSAND/ΜL (ref 0–0.61)
EOSINOPHIL NFR BLD AUTO: 6 % (ref 0–6)
ERYTHROCYTE [DISTWIDTH] IN BLOOD BY AUTOMATED COUNT: 12 % (ref 11.6–15.1)
GFR SERPL CREATININE-BSD FRML MDRD: 66 ML/MIN/1.73SQ M
GLUCOSE SERPL-MCNC: 112 MG/DL (ref 65–140)
HCT VFR BLD AUTO: 45.4 % (ref 36.5–49.3)
HGB BLD-MCNC: 14.7 G/DL (ref 12–17)
IMM GRANULOCYTES # BLD AUTO: 0.02 THOUSAND/UL (ref 0–0.2)
IMM GRANULOCYTES NFR BLD AUTO: 0 % (ref 0–2)
LYMPHOCYTES # BLD AUTO: 2.31 THOUSANDS/ΜL (ref 0.6–4.47)
LYMPHOCYTES NFR BLD AUTO: 44 % (ref 14–44)
MAGNESIUM SERPL-MCNC: 2.1 MG/DL (ref 1.6–2.6)
MCH RBC QN AUTO: 32.7 PG (ref 26.8–34.3)
MCHC RBC AUTO-ENTMCNC: 32.4 G/DL (ref 31.4–37.4)
MCV RBC AUTO: 101 FL (ref 82–98)
MONOCYTES # BLD AUTO: 0.55 THOUSAND/ΜL (ref 0.17–1.22)
MONOCYTES NFR BLD AUTO: 11 % (ref 4–12)
NEUTROPHILS # BLD AUTO: 1.96 THOUSANDS/ΜL (ref 1.85–7.62)
NEUTS SEG NFR BLD AUTO: 38 % (ref 43–75)
NRBC BLD AUTO-RTO: 0 /100 WBCS
PLATELET # BLD AUTO: 218 THOUSANDS/UL (ref 149–390)
PMV BLD AUTO: 9.6 FL (ref 8.9–12.7)
POTASSIUM SERPL-SCNC: 4.4 MMOL/L (ref 3.5–5.3)
PROT SERPL-MCNC: 7.1 G/DL (ref 6.4–8.2)
RBC # BLD AUTO: 4.5 MILLION/UL (ref 3.88–5.62)
SODIUM SERPL-SCNC: 140 MMOL/L (ref 136–145)
WBC # BLD AUTO: 5.21 THOUSAND/UL (ref 4.31–10.16)

## 2019-10-28 PROCEDURE — 80053 COMPREHEN METABOLIC PANEL: CPT | Performed by: INTERNAL MEDICINE

## 2019-10-28 PROCEDURE — 85025 COMPLETE CBC W/AUTO DIFF WBC: CPT | Performed by: INTERNAL MEDICINE

## 2019-10-28 PROCEDURE — 94760 N-INVAS EAR/PLS OXIMETRY 1: CPT | Performed by: SOCIAL WORKER

## 2019-10-28 PROCEDURE — 99220 PR INITIAL OBSERVATION CARE/DAY 70 MINUTES: CPT | Performed by: INTERNAL MEDICINE

## 2019-10-28 PROCEDURE — 83735 ASSAY OF MAGNESIUM: CPT | Performed by: INTERNAL MEDICINE

## 2019-10-28 PROCEDURE — G0379 DIRECT REFER HOSPITAL OBSERV: HCPCS

## 2019-10-28 RX ORDER — UREA 10 %
500 LOTION (ML) TOPICAL
Status: DISCONTINUED | OUTPATIENT
Start: 2019-10-28 | End: 2019-10-31 | Stop reason: HOSPADM

## 2019-10-28 RX ORDER — QUETIAPINE FUMARATE 50 MG/1
50 TABLET, EXTENDED RELEASE ORAL
Status: DISCONTINUED | OUTPATIENT
Start: 2019-10-28 | End: 2019-10-31 | Stop reason: HOSPADM

## 2019-10-28 RX ORDER — GABAPENTIN 300 MG/1
300 CAPSULE ORAL 4 TIMES DAILY
Status: DISCONTINUED | OUTPATIENT
Start: 2019-10-28 | End: 2019-10-31 | Stop reason: HOSPADM

## 2019-10-28 RX ORDER — CLONAZEPAM 1 MG/1
1 TABLET ORAL
Status: DISCONTINUED | OUTPATIENT
Start: 2019-10-28 | End: 2019-10-28

## 2019-10-28 RX ORDER — LORATADINE 10 MG/1
10 TABLET ORAL DAILY
Status: DISCONTINUED | OUTPATIENT
Start: 2019-10-29 | End: 2019-10-31 | Stop reason: HOSPADM

## 2019-10-28 RX ORDER — VENLAFAXINE HYDROCHLORIDE 37.5 MG/1
75 CAPSULE, EXTENDED RELEASE ORAL DAILY
Status: DISCONTINUED | OUTPATIENT
Start: 2019-10-29 | End: 2019-10-28

## 2019-10-28 RX ORDER — DIVALPROEX SODIUM 500 MG/1
500 TABLET, DELAYED RELEASE ORAL EVERY 12 HOURS SCHEDULED
Status: DISCONTINUED | OUTPATIENT
Start: 2019-10-28 | End: 2019-10-31 | Stop reason: HOSPADM

## 2019-10-28 RX ORDER — LANOLIN ALCOHOL/MO/W.PET/CERES
3 CREAM (GRAM) TOPICAL
Status: DISCONTINUED | OUTPATIENT
Start: 2019-10-28 | End: 2019-10-31 | Stop reason: HOSPADM

## 2019-10-28 RX ORDER — VENLAFAXINE HYDROCHLORIDE 37.5 MG/1
37.5 CAPSULE, EXTENDED RELEASE ORAL DAILY
Status: DISCONTINUED | OUTPATIENT
Start: 2019-10-29 | End: 2019-10-31 | Stop reason: HOSPADM

## 2019-10-28 RX ORDER — DIVALPROEX SODIUM 500 MG/1
1000 TABLET, DELAYED RELEASE ORAL EVERY 12 HOURS SCHEDULED
Status: DISCONTINUED | OUTPATIENT
Start: 2019-10-28 | End: 2019-10-28

## 2019-10-28 RX ADMIN — QUETIAPINE FUMARATE 50 MG: 50 TABLET, EXTENDED RELEASE ORAL at 22:32

## 2019-10-28 RX ADMIN — Medication 500 MG: at 20:00

## 2019-10-28 RX ADMIN — MELATONIN 3 MG: 3 TAB ORAL at 21:39

## 2019-10-28 RX ADMIN — KETAMINE HYDROCHLORIDE 0.1 MG/KG/HR: 50 INJECTION, SOLUTION INTRAMUSCULAR; INTRAVENOUS at 20:06

## 2019-10-28 RX ADMIN — DIVALPROEX SODIUM 500 MG: 500 TABLET, DELAYED RELEASE ORAL at 20:16

## 2019-10-28 RX ADMIN — GABAPENTIN 300 MG: 300 CAPSULE ORAL at 20:06

## 2019-10-28 NOTE — ASSESSMENT & PLAN NOTE
Patient with long history of chronic migraines direct admitted from his neurologist  Reports pain is a chronic 5/10 headache that resumed approximately 1 week after last ketamine drip on 08/26/2019  Will admit for ketamine drip; hold home opiates  Consult to Neurology  Monitor for improved
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
## 2019-10-29 ENCOUNTER — APPOINTMENT (INPATIENT)
Dept: RADIOLOGY | Facility: HOSPITAL | Age: 64
DRG: 054 | End: 2019-10-29
Payer: COMMERCIAL

## 2019-10-29 LAB
ANION GAP SERPL CALCULATED.3IONS-SCNC: 7 MMOL/L (ref 4–13)
BASOPHILS # BLD AUTO: 0.06 THOUSANDS/ΜL (ref 0–0.1)
BASOPHILS NFR BLD AUTO: 1 % (ref 0–1)
BUN SERPL-MCNC: 24 MG/DL (ref 5–25)
CALCIUM SERPL-MCNC: 9.1 MG/DL (ref 8.3–10.1)
CHLORIDE SERPL-SCNC: 108 MMOL/L (ref 100–108)
CO2 SERPL-SCNC: 27 MMOL/L (ref 21–32)
CREAT SERPL-MCNC: 1.08 MG/DL (ref 0.6–1.3)
EOSINOPHIL # BLD AUTO: 0.32 THOUSAND/ΜL (ref 0–0.61)
EOSINOPHIL NFR BLD AUTO: 6 % (ref 0–6)
ERYTHROCYTE [DISTWIDTH] IN BLOOD BY AUTOMATED COUNT: 11.9 % (ref 11.6–15.1)
GFR SERPL CREATININE-BSD FRML MDRD: 73 ML/MIN/1.73SQ M
GLUCOSE P FAST SERPL-MCNC: 127 MG/DL (ref 65–99)
GLUCOSE SERPL-MCNC: 127 MG/DL (ref 65–140)
HCT VFR BLD AUTO: 41.7 % (ref 36.5–49.3)
HGB BLD-MCNC: 13.5 G/DL (ref 12–17)
IMM GRANULOCYTES # BLD AUTO: 0.03 THOUSAND/UL (ref 0–0.2)
IMM GRANULOCYTES NFR BLD AUTO: 1 % (ref 0–2)
LYMPHOCYTES # BLD AUTO: 2.54 THOUSANDS/ΜL (ref 0.6–4.47)
LYMPHOCYTES NFR BLD AUTO: 43 % (ref 14–44)
MAGNESIUM SERPL-MCNC: 2 MG/DL (ref 1.6–2.6)
MCH RBC QN AUTO: 33.2 PG (ref 26.8–34.3)
MCHC RBC AUTO-ENTMCNC: 32.4 G/DL (ref 31.4–37.4)
MCV RBC AUTO: 103 FL (ref 82–98)
MONOCYTES # BLD AUTO: 0.8 THOUSAND/ΜL (ref 0.17–1.22)
MONOCYTES NFR BLD AUTO: 14 % (ref 4–12)
NEUTROPHILS # BLD AUTO: 2 THOUSANDS/ΜL (ref 1.85–7.62)
NEUTS SEG NFR BLD AUTO: 35 % (ref 43–75)
NRBC BLD AUTO-RTO: 0 /100 WBCS
PHOSPHATE SERPL-MCNC: 3.7 MG/DL (ref 2.3–4.1)
PLATELET # BLD AUTO: 189 THOUSANDS/UL (ref 149–390)
PMV BLD AUTO: 10 FL (ref 8.9–12.7)
POTASSIUM SERPL-SCNC: 3.9 MMOL/L (ref 3.5–5.3)
RBC # BLD AUTO: 4.07 MILLION/UL (ref 3.88–5.62)
SODIUM SERPL-SCNC: 142 MMOL/L (ref 136–145)
WBC # BLD AUTO: 5.75 THOUSAND/UL (ref 4.31–10.16)

## 2019-10-29 PROCEDURE — 85025 COMPLETE CBC W/AUTO DIFF WBC: CPT | Performed by: INTERNAL MEDICINE

## 2019-10-29 PROCEDURE — 83735 ASSAY OF MAGNESIUM: CPT | Performed by: INTERNAL MEDICINE

## 2019-10-29 PROCEDURE — 84100 ASSAY OF PHOSPHORUS: CPT | Performed by: INTERNAL MEDICINE

## 2019-10-29 PROCEDURE — 99254 IP/OBS CNSLTJ NEW/EST MOD 60: CPT | Performed by: PSYCHIATRY & NEUROLOGY

## 2019-10-29 PROCEDURE — 99232 SBSQ HOSP IP/OBS MODERATE 35: CPT | Performed by: PHYSICIAN ASSISTANT

## 2019-10-29 PROCEDURE — 80048 BASIC METABOLIC PNL TOTAL CA: CPT | Performed by: INTERNAL MEDICINE

## 2019-10-29 PROCEDURE — 74022 RADEX COMPL AQT ABD SERIES: CPT

## 2019-10-29 RX ADMIN — ENOXAPARIN SODIUM 40 MG: 40 INJECTION SUBCUTANEOUS at 09:33

## 2019-10-29 RX ADMIN — GABAPENTIN 300 MG: 300 CAPSULE ORAL at 12:42

## 2019-10-29 RX ADMIN — GABAPENTIN 300 MG: 300 CAPSULE ORAL at 21:57

## 2019-10-29 RX ADMIN — GABAPENTIN 300 MG: 300 CAPSULE ORAL at 18:09

## 2019-10-29 RX ADMIN — QUETIAPINE FUMARATE 50 MG: 50 TABLET, EXTENDED RELEASE ORAL at 21:58

## 2019-10-29 RX ADMIN — VENLAFAXINE HYDROCHLORIDE 37.5 MG: 37.5 CAPSULE, EXTENDED RELEASE ORAL at 09:33

## 2019-10-29 RX ADMIN — GABAPENTIN 300 MG: 300 CAPSULE ORAL at 09:33

## 2019-10-29 RX ADMIN — LORATADINE 10 MG: 10 TABLET ORAL at 09:33

## 2019-10-29 RX ADMIN — DIVALPROEX SODIUM 500 MG: 500 TABLET, DELAYED RELEASE ORAL at 21:57

## 2019-10-29 RX ADMIN — MELATONIN 3 MG: 3 TAB ORAL at 21:57

## 2019-10-29 RX ADMIN — Medication 500 MG: at 07:16

## 2019-10-29 RX ADMIN — KETAMINE HYDROCHLORIDE 0.3 MG/KG/HR: 50 INJECTION, SOLUTION INTRAMUSCULAR; INTRAVENOUS at 17:01

## 2019-10-29 RX ADMIN — DIVALPROEX SODIUM 500 MG: 500 TABLET, DELAYED RELEASE ORAL at 09:33

## 2019-10-29 NOTE — ASSESSMENT & PLAN NOTE
· Patient with long history of chronic migraines direct admitted from his neurologist  · Reports pain is a chronic 5/10 headache that resumed approximately 1 week after last ketamine drip on 08/26/2019  · Will admit for ketamine drip; hold home opiates, drip increased this morning  · Constipation presumed secondary to opiates, will order abdominal imaging  · Consult to Neurology  · Monitor for improved
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
## 2019-10-29 NOTE — UTILIZATION REVIEW
Notification of Inpatient Admission/Inpatient Authorization Request  This is a Notification of Inpatient Admission/Request for Inpatient Authorization for our facility [facility]  Be advised that this patient was admitted to our facility under Inpatient Status  Please contact the Utilization Review Department where the patient is receiving care services for additional admission information  Facility: [facility] ([address]")  Place of Service Code: 21   Place of Service Name: [address]  Presentation Date & Time: 10/28/2019  3:08 PM  Inpatient Admission Date & Time: 10/28/19 1508  Discharge Date & Time: No discharge date for patient encounter  Discharge Disposition (if discharged): Home/Self Care  Attending Physician and NPI#: Destiny Christian, Rocky Barahona [[id number]]   Attending Physician:  DELMA Tubbs  [facility] ID- [id number]  [address] [place] [address]  Phone 1: [phone]  Fax: [phone]  Admission Orders (From admission, onward)     Ordered        10/29/19 1048  Inpatient Admission  Once         10/28/19 1617  Place in Observation  Once                     [address] [place] [address] Utilization Review Department  Phone: [phone]; Fax [phone]  [email or web address]  ATTENTION: Please call with any questions or concerns to [phone] and carefully listen to the prompts so that you are directed to the right person  All voicemails are confidential   Bismark Gage all requests for admission clinical reviews, approved or denied determinations and any other requests to dedicated fax number below belonging to the campus where the patient is receiving treatment
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
## 2019-10-30 LAB
ANION GAP SERPL CALCULATED.3IONS-SCNC: 7 MMOL/L (ref 4–13)
BASOPHILS # BLD AUTO: 0.03 THOUSANDS/ΜL (ref 0–0.1)
BASOPHILS NFR BLD AUTO: 1 % (ref 0–1)
BUN SERPL-MCNC: 17 MG/DL (ref 5–25)
CALCIUM SERPL-MCNC: 9.1 MG/DL (ref 8.3–10.1)
CHLORIDE SERPL-SCNC: 108 MMOL/L (ref 100–108)
CO2 SERPL-SCNC: 24 MMOL/L (ref 21–32)
CREAT SERPL-MCNC: 0.88 MG/DL (ref 0.6–1.3)
EOSINOPHIL # BLD AUTO: 0.2 THOUSAND/ΜL (ref 0–0.61)
EOSINOPHIL NFR BLD AUTO: 4 % (ref 0–6)
ERYTHROCYTE [DISTWIDTH] IN BLOOD BY AUTOMATED COUNT: 11.9 % (ref 11.6–15.1)
GFR SERPL CREATININE-BSD FRML MDRD: 92 ML/MIN/1.73SQ M
GLUCOSE SERPL-MCNC: 103 MG/DL (ref 65–140)
HCT VFR BLD AUTO: 42 % (ref 36.5–49.3)
HGB BLD-MCNC: 13.9 G/DL (ref 12–17)
IMM GRANULOCYTES # BLD AUTO: 0.03 THOUSAND/UL (ref 0–0.2)
IMM GRANULOCYTES NFR BLD AUTO: 1 % (ref 0–2)
LYMPHOCYTES # BLD AUTO: 1.87 THOUSANDS/ΜL (ref 0.6–4.47)
LYMPHOCYTES NFR BLD AUTO: 34 % (ref 14–44)
MCH RBC QN AUTO: 33.2 PG (ref 26.8–34.3)
MCHC RBC AUTO-ENTMCNC: 33.1 G/DL (ref 31.4–37.4)
MCV RBC AUTO: 100 FL (ref 82–98)
MONOCYTES # BLD AUTO: 0.63 THOUSAND/ΜL (ref 0.17–1.22)
MONOCYTES NFR BLD AUTO: 12 % (ref 4–12)
NEUTROPHILS # BLD AUTO: 2.67 THOUSANDS/ΜL (ref 1.85–7.62)
NEUTS SEG NFR BLD AUTO: 48 % (ref 43–75)
NRBC BLD AUTO-RTO: 0 /100 WBCS
PLATELET # BLD AUTO: 192 THOUSANDS/UL (ref 149–390)
PMV BLD AUTO: 9.6 FL (ref 8.9–12.7)
POTASSIUM SERPL-SCNC: 3.8 MMOL/L (ref 3.5–5.3)
RBC # BLD AUTO: 4.19 MILLION/UL (ref 3.88–5.62)
SODIUM SERPL-SCNC: 139 MMOL/L (ref 136–145)
WBC # BLD AUTO: 5.43 THOUSAND/UL (ref 4.31–10.16)

## 2019-10-30 PROCEDURE — 85025 COMPLETE CBC W/AUTO DIFF WBC: CPT | Performed by: PHYSICIAN ASSISTANT

## 2019-10-30 PROCEDURE — 80048 BASIC METABOLIC PNL TOTAL CA: CPT | Performed by: PHYSICIAN ASSISTANT

## 2019-10-30 PROCEDURE — 99231 SBSQ HOSP IP/OBS SF/LOW 25: CPT | Performed by: PSYCHIATRY & NEUROLOGY

## 2019-10-30 PROCEDURE — 99232 SBSQ HOSP IP/OBS MODERATE 35: CPT | Performed by: PHYSICIAN ASSISTANT

## 2019-10-30 RX ADMIN — MELATONIN 3 MG: 3 TAB ORAL at 21:51

## 2019-10-30 RX ADMIN — GABAPENTIN 300 MG: 300 CAPSULE ORAL at 17:00

## 2019-10-30 RX ADMIN — ENOXAPARIN SODIUM 40 MG: 40 INJECTION SUBCUTANEOUS at 08:22

## 2019-10-30 RX ADMIN — GABAPENTIN 300 MG: 300 CAPSULE ORAL at 08:22

## 2019-10-30 RX ADMIN — DIVALPROEX SODIUM 500 MG: 500 TABLET, DELAYED RELEASE ORAL at 21:51

## 2019-10-30 RX ADMIN — QUETIAPINE FUMARATE 50 MG: 50 TABLET, EXTENDED RELEASE ORAL at 22:00

## 2019-10-30 RX ADMIN — Medication 500 MG: at 06:04

## 2019-10-30 RX ADMIN — DIVALPROEX SODIUM 500 MG: 500 TABLET, DELAYED RELEASE ORAL at 08:22

## 2019-10-30 RX ADMIN — VENLAFAXINE HYDROCHLORIDE 37.5 MG: 37.5 CAPSULE, EXTENDED RELEASE ORAL at 08:22

## 2019-10-30 RX ADMIN — Medication 500 MG: at 16:32

## 2019-10-30 RX ADMIN — GABAPENTIN 300 MG: 300 CAPSULE ORAL at 21:51

## 2019-10-30 RX ADMIN — KETAMINE HYDROCHLORIDE 0.3 MG/KG/HR: 50 INJECTION, SOLUTION INTRAMUSCULAR; INTRAVENOUS at 03:52

## 2019-10-30 RX ADMIN — KETAMINE HYDROCHLORIDE 0.3 MG/KG/HR: 50 INJECTION, SOLUTION INTRAMUSCULAR; INTRAVENOUS at 16:26

## 2019-10-30 RX ADMIN — LORATADINE 10 MG: 10 TABLET ORAL at 08:22

## 2019-10-30 RX ADMIN — GABAPENTIN 300 MG: 300 CAPSULE ORAL at 11:01

## 2019-10-30 NOTE — PROGRESS NOTES
[facility] Internal Medicine  Progress Note - Kayla Spine 1955, 60 y o  male MRN: [id number]  Unit/Bed#: [facility] 708-01 Encounter: [id number]  Primary Care Provider: Norma Antunez DO   Date and time admitted to hospital: 10/28/2019  3:08 PM    * Intractable chronic migraine without aura and without status migrainosus  Assessment & Plan  · Patient with long history of chronic migraines direct admitted from his neurologist  · Reports pain is a chronic 5/10 headache that resumed approximately 1 week after last ketamine drip on 08/26/2019  · Will admit for ketamine drip; hold home opiates, drip increased 10/29  · Constipation presumed secondary to opiates, will order abdominal imaging  · Consult to Neurology, appreciate management of migraine  · Monitor for improved      Mixed sleep apnea  Assessment & Plan  · CPAP p r n  Insomnia  Assessment & Plan  · Continue home dose of melatonin, Benadryl, and Seroquel q h s  VTE Pharmacologic Prophylaxis:   Pharmacologic: Enoxaparin (Lovenox)  Mechanical VTE Prophylaxis in Place: Yes    Patient Centered Rounds: I have performed bedside rounds with nursing staff today  Discussions with Specialists or Other Care Team Provider:  Nursing and case management    Education and Discussions with Family / Patient: Discussed care plan with patient at bedside  Answered all questions to the best of my ability  Time Spent for Care: 20 minutes  More than 50% of total time spent on counseling and coordination of care as described above  Current Length of Stay: 2 day(s)    Current Patient Status: Inpatient   Certification Statement: The patient will continue to require additional inpatient hospital stay due to Ketamine drip required for migraine pain control    Discharge Plan: Patient comes from home, plan to return home when chronic headache returns to baseline  Anticipate 24-48 hours prior to discharge       Code Status: Level 1 - Full Code      Subjective:   Patient reports pain remains 4 to 5/10 today  He said he had a rough night and did not sleep at all  He will update his family on his status  He still feels relyant on the ketamine drip  Denies any numbness, tingling, lightheadedness or dizziness associated with his migraine  He is eating and drinking appropriately  Reports chronic constipation without exacerbation at this time  Objective:     Vitals:   Temp (24hrs), Av 8 °F (37 1 °C), Min:98 8 °F (37 1 °C), Max:98 8 °F (37 1 °C)    Temp:  [98 8 °F (37 1 °C)] 98 8 °F (37 1 °C)  HR:  [] 93  Resp:  [19-20] 20  BP: (109-160)/(69-98) 160/98  SpO2:  [95 %-97 %] 97 %  There is no height or weight on file to calculate BMI  Input and Output Summary (last 24 hours): Intake/Output Summary (Last 24 hours) at 10/30/2019 0735  Last data filed at 10/29/2019 1730  Gross per 24 hour   Intake 791 24 ml   Output --   Net 791 24 ml       Physical Exam:     Physical Exam   Constitutional: He appears well-developed and well-nourished  No distress  HENT:   Head: Normocephalic and atraumatic  Eyes: Conjunctivae are normal  No scleral icterus  Cardiovascular: Normal rate and regular rhythm  No murmur heard  Pulmonary/Chest: Effort normal and breath sounds normal  No respiratory distress  He has no wheezes  He has no rales  Abdominal: Soft  He exhibits no distension  There is no tenderness  Musculoskeletal: He exhibits no edema  Neurological: He is alert  Skin: Skin is warm and dry  No erythema  Psychiatric: He has a normal mood and affect  Nursing note and vitals reviewed      Additional Data:     Labs:    Results from last 7 days   Lab Units 10/30/19  0633   WBC Thousand/uL 5 43   HEMOGLOBIN g/dL 13 9   HEMATOCRIT % 42 0   PLATELETS Thousands/uL 192   NEUTROS PCT % 48   LYMPHS PCT % 34   MONOS PCT % 12   EOS PCT % 4     Results from last 7 days   Lab Units 10/30/19  0633  10/28/19  1713   SODIUM mmol/L 139   < > 140   POTASSIUM mmol/L 3 8   < > 4 4 CHLORIDE mmol/L 108   < > 107   CO2 mmol/L 24   < > 26   BUN mg/dL 17   < > 22   CREATININE mg/dL 0 88   < > 1 18   ANION GAP mmol/L 7   < > 7   CALCIUM mg/dL 9 1   < > 9 3   ALBUMIN g/dL  --   --  4 2   TOTAL BILIRUBIN mg/dL  --   --  0 50   ALK PHOS U/L  --   --  55   ALT U/L  --   --  34   AST U/L  --   --  34   GLUCOSE RANDOM mg/dL 103   < > 112    < > = values in this interval not displayed  * I Have Reviewed All Lab Data Listed Above  * Additional Pertinent Lab Tests Reviewed: All Labs Within Last 24 Hours Reviewed    Imaging:    Imaging Reports Reviewed Today Include:  · Xray abd 10/29: Nonobstructive bowel gas pattern  Stimulator leads in R hemiabdomen with battery pack adjacent to iliac crest  Mild to moderate amount of retained colonic stool  Imaging Personally Reviewed by Myself Includes:  None    Recent Cultures (last 7 days):           Last 24 Hours Medication List:     Current Facility-Administered Medications:  divalproex sodium 500 mg Oral Q12H [address] Mari Friend MD    enoxaparin 40 mg Subcutaneous Daily Mari Friend MD    gabapentin 300 mg Oral 4x Daily Mari Friend MD    ketamine 0 3 mg/kg/hr Intravenous Continuous Marlena Calvillo PA-C Last Rate: 0 3 mg/kg/hr (10/30/19 0352)   loratadine 10 mg Oral Daily Mari Friedn MD    magnesium gluconate 500 mg Oral BID AC Mari Friend MD    melatonin 3 mg Oral HS Mari Friend MD    QUEtiapine 50 mg Oral HS Mari Friend MD    venlafaxine 37 5 mg Oral Daily Mari Friend MD         Today, Patient Was Seen By: Gena Napier PA-C    ** Please Note: Dictation voice to text software may have been used in the creation of this document   **

## 2019-10-30 NOTE — ASSESSMENT & PLAN NOTE
· Patient with long history of chronic migraines direct admitted from his neurologist  · Reports pain is a chronic 5/10 headache that resumed approximately 1 week after last ketamine drip on 08/26/2019  · Will admit for ketamine drip; hold home opiates, drip increased 10/29  · Constipation presumed secondary to opiates, abdominal imaging- normal bowel gas pattern  · Consult to Neurology, appreciate management of migraine  · Monitor for improved

## 2019-10-30 NOTE — PROGRESS NOTES
PATIENT NAME: Viji Cespedes,  YOB: 1955  MEDICAL RECORD NUMBER: [id number]  Neurology Follow up Note     Overnight Events: Patient states that his pain was 8-9/10 last night after being 4/10 throughout day yesterday  Unsure what caused increase in pain  Says this contributed to his lack of sleep last night  Denies any other symptoms  Subjective: Patient feels well today  He describes his pain as currently a 4/10  Pt is on 0 3 mg/kg/hr of ketamine infusion  He is eating well  Has no other complaints currently  12 point ROS negative for any changes  Scheduled Meds:  Current Facility-Administered Medications:  divalproex sodium 500 mg Oral Q12H [facility] Al Soto MD    enoxaparin 40 mg Subcutaneous Daily Al Soto MD    gabapentin 300 mg Oral 4x Daily Al Soto MD    ketamine 0 3 mg/kg/hr Intravenous Continuous Otoniel Webster PA-C Last Rate: 0 3 mg/kg/hr (10/30/19 0352)   loratadine 10 mg Oral Daily Al Soto MD    magnesium gluconate 500 mg Oral BID AC Al Soto MD    melatonin 3 mg Oral HS Al Soto MD    QUEtiapine 50 mg Oral HS Al Soto MD    venlafaxine 37 5 mg Oral Daily Al Soto MD      Continuous Infusions:  ketamine 0 3 mg/kg/hr Last Rate: 0 3 mg/kg/hr (10/30/19 0352)     PRN Meds:  Objective  /77   Pulse 90   Temp 98 6 °F (37 °C)   Resp 16   SpO2 95%   General Examination: In no apparent distress, well developed and well nourished, and cooperative   HEENT: Normocephalic, Atraumatic  Moist mucus membranes     CVS: Regular rate and rhythm  S1 S2 noted  No audible murmurs  No carotids bruits  Peripheral pulses palpable throughout   Lungs: Clear to auscultation bilaterally  No rales, rhonchi, wheezing  Abdomen: Bowel sounds positive  Non- tender  Non-distended  No organomegaly  Ext: No edema   Psych: Thought content - No VH/AH  No delusions   Though Process - logical    Skin - No rash     Neurological Examination:   Mental Status: The patient was awake, alert, attentive, oriented to person, place, and time  Recent and remote memory intact to conversation with no evidence of language dysfunction  Satisfactory fund of knowledge  Normal attention span and concentration      Cranial Nerves:   I: smell Not tested   II: visual fields Full to confrontation  Pupils equal, round, reactive to light with normal accomodation  III,IV,VI: extraocular muscles EOMI, no nystagmus   V: masseter and pterygoid strength full  Sensation in the V1 through V3 distributions intact to pinprick and light touch bilaterally  VII: Face is symmetric with no weakness noted  VIII: Audition intact to finger rub bilaterally  IX/X: Uvula midline  Soft palate elevation symmetric  XI: Trapezius and SCM strength 5/5 B/L  XII: Tongue midline with no atrophy or fasciculations with appropriate movement       Motor Examination:   No pronator drift  Bulk: Normal  No atrophy Tone: Normal  Fasciculations: None                   Deltoid Biceps Triceps WE   WF   FF IO     Right        5         5          5         5      5      5   5        Left           9        5          0          2      5     5 Rubio Carrillo                        IP        Quad   Ham     TA       Gastroc   Right      5            5          2         5                5  Left         5            5         5         5                5            Reflexes:                   WUUYUP Brachioradialis Triceps Patella Achilles   Right          2+            2+                  2+        2+       2+           Left            2+             2+                 2+         2+       2+              Clonus: None     Pathological Reflexes:  Hoffmans: negative  Babinsky: negative  Jaw Jerk: negative        Coordination: Patient able to perform normal finger-to-nose and heel to shin appropriately  Normal rapid alternating movements       Sensory: Normal sensation to light touch, pin prick and vibratory sensation throughout     Gait:not attempted today  Recent Results (from the past 24 hour(s))   CBC and differential    Collection Time: 10/30/19  6:33 AM   Result Value Ref Range    WBC 5 43 4 31 - 10 16 Thousand/uL    RBC 4 19 3 88 - 5 62 Million/uL    Hemoglobin 13 9 12 0 - 17 0 g/dL    Hematocrit 42 0 36 5 - 49 3 %     (H) 82 - 98 fL    MCH 33 2 26 8 - 34 3 pg    MCHC 33 1 31 4 - 37 4 g/dL    RDW 11 9 11 6 - 15 1 %    MPV 9 6 8 9 - 12 7 fL    Platelets 335 646 - 101 Thousands/uL    nRBC 0 /100 WBCs    Neutrophils Relative 48 43 - 75 %    Immat GRANS % 1 0 - 2 %    Lymphocytes Relative 34 14 - 44 %    Monocytes Relative 12 4 - 12 %    Eosinophils Relative 4 0 - 6 %    Basophils Relative 1 0 - 1 %    Neutrophils Absolute 2 67 1 85 - 7 62 Thousands/µL    Immature Grans Absolute 0 03 0 00 - 0 20 Thousand/uL    Lymphocytes Absolute 1 87 0 60 - 4 47 Thousands/µL    Monocytes Absolute 0 63 0 17 - 1 22 Thousand/µL    Eosinophils Absolute 0 20 0 00 - 0 61 Thousand/µL    Basophils Absolute 0 03 0 00 - 0 10 Thousands/µL   Basic metabolic panel    Collection Time: 10/30/19  6:33 AM   Result Value Ref Range    Sodium 139 136 - 145 mmol/L    Potassium 3 8 3 5 - 5 3 mmol/L    Chloride 108 100 - 108 mmol/L    CO2 24 21 - 32 mmol/L    ANION GAP 7 4 - 13 mmol/L    BUN 17 5 - 25 mg/dL    Creatinine 0 88 0 60 - 1 30 mg/dL    Glucose 103 65 - 140 mg/dL    Calcium 9 1 8 3 - 10 1 mg/dL    eGFR 92 ml/min/1 73sq m         A/P  60 y o  yo [unfilled] with chronic migraines here for ketamine infusion  Pt is clinically stable  He currently describes his pain as 4/10  Neurological Disposition: Continue to uptitrate ketamine by 0 1mg/kg every 4-6 hours as tolerated  Will continue to uptitrate until pain is < 3/10  Dose will be maintained and then weaned over 8-12 hour period

## 2019-10-31 VITALS
HEART RATE: 100 BPM | RESPIRATION RATE: 20 BRPM | SYSTOLIC BLOOD PRESSURE: 130 MMHG | DIASTOLIC BLOOD PRESSURE: 64 MMHG | TEMPERATURE: 99.2 F | OXYGEN SATURATION: 96 %

## 2019-10-31 PROCEDURE — ND001 PR NO DOCUMENTATION: Performed by: PSYCHIATRY & NEUROLOGY

## 2019-10-31 PROCEDURE — 99239 HOSP IP/OBS DSCHRG MGMT >30: CPT | Performed by: NURSE PRACTITIONER

## 2019-10-31 RX ORDER — POLYETHYLENE GLYCOL 3350 17 G/17G
17 POWDER, FOR SOLUTION ORAL DAILY
Status: DISCONTINUED | OUTPATIENT
Start: 2019-10-31 | End: 2019-10-31 | Stop reason: HOSPADM

## 2019-10-31 RX ORDER — POLYETHYLENE GLYCOL 3350 17 G/17G
17 POWDER, FOR SOLUTION ORAL DAILY
Qty: 14 EACH | Refills: 0
Start: 2019-11-01 | End: 2020-04-08 | Stop reason: ALTCHOICE

## 2019-10-31 RX ORDER — DIVALPROEX SODIUM 500 MG/1
500 TABLET, DELAYED RELEASE ORAL EVERY 12 HOURS SCHEDULED
Refills: 0
Start: 2019-10-31 | End: 2020-04-08 | Stop reason: ALTCHOICE

## 2019-10-31 RX ORDER — DIVALPROEX SODIUM 500 MG/1
1000 TABLET, DELAYED RELEASE ORAL EVERY 12 HOURS SCHEDULED
Start: 2019-10-31 | End: 2019-10-31 | Stop reason: SDUPTHER

## 2019-10-31 RX ORDER — CLONAZEPAM 0.25 MG/1
0.25 TABLET, ORALLY DISINTEGRATING ORAL 2 TIMES DAILY PRN
Start: 2019-10-31 | End: 2020-04-08 | Stop reason: ALTCHOICE

## 2019-10-31 RX ORDER — SENNOSIDES 8.6 MG
2 TABLET ORAL
Qty: 120 EACH | Refills: 0
Start: 2019-10-31 | End: 2020-04-08 | Stop reason: ALTCHOICE

## 2019-10-31 RX ORDER — VENLAFAXINE HYDROCHLORIDE 37.5 MG/1
37.5 CAPSULE, EXTENDED RELEASE ORAL DAILY
Refills: 0
Start: 2019-11-01 | End: 2020-04-08 | Stop reason: ALTCHOICE

## 2019-10-31 RX ORDER — SENNOSIDES 8.6 MG
2 TABLET ORAL
Status: DISCONTINUED | OUTPATIENT
Start: 2019-10-31 | End: 2019-10-31 | Stop reason: HOSPADM

## 2019-10-31 RX ADMIN — GABAPENTIN 300 MG: 300 CAPSULE ORAL at 08:36

## 2019-10-31 RX ADMIN — GABAPENTIN 300 MG: 300 CAPSULE ORAL at 17:08

## 2019-10-31 RX ADMIN — KETAMINE HYDROCHLORIDE 0.2 MG/KG/HR: 50 INJECTION, SOLUTION INTRAMUSCULAR; INTRAVENOUS at 11:45

## 2019-10-31 RX ADMIN — Medication 500 MG: at 17:00

## 2019-10-31 RX ADMIN — LORATADINE 10 MG: 10 TABLET ORAL at 08:36

## 2019-10-31 RX ADMIN — ENOXAPARIN SODIUM 40 MG: 40 INJECTION SUBCUTANEOUS at 08:36

## 2019-10-31 RX ADMIN — KETAMINE HYDROCHLORIDE 0.3 MG/KG/HR: 50 INJECTION, SOLUTION INTRAMUSCULAR; INTRAVENOUS at 05:17

## 2019-10-31 RX ADMIN — VENLAFAXINE HYDROCHLORIDE 37.5 MG: 37.5 CAPSULE, EXTENDED RELEASE ORAL at 08:36

## 2019-10-31 RX ADMIN — GABAPENTIN 300 MG: 300 CAPSULE ORAL at 11:43

## 2019-10-31 RX ADMIN — DIVALPROEX SODIUM 500 MG: 500 TABLET, DELAYED RELEASE ORAL at 08:36

## 2019-10-31 RX ADMIN — Medication 500 MG: at 05:32

## 2019-10-31 NOTE — PLAN OF CARE
Problem: Potential for Falls  Goal: Patient will remain free of falls  Description  INTERVENTIONS:  - Assess patient frequently for physical needs  -  Identify cognitive and physical deficits and behaviors that affect risk of falls    -  [place] fall precautions as indicated by assessment   - Educate patient/family on patient safety including physical limitations  - Instruct patient to call for assistance with activity based on assessment  - Modify environment to reduce risk of injury  - Consider OT/PT consult to assist with strengthening/mobility  Outcome: Progressing     Problem: PAIN - ADULT  Goal: Verbalizes/displays adequate comfort level or baseline comfort level  Description  Interventions:  - Encourage patient to monitor pain and request assistance  - Assess pain using appropriate pain scale  - Administer analgesics based on type and severity of pain and evaluate response  - Implement non-pharmacological measures as appropriate and evaluate response  - Consider cultural and social influences on pain and pain management  - Notify physician/advanced practitioner if interventions unsuccessful or patient reports new pain  Outcome: Progressing     Problem: SAFETY ADULT  Goal: Maintain or return to baseline ADL function  Description  INTERVENTIONS:  -  Assess patient's ability to carry out ADLs; assess patient's baseline for ADL function and identify physical deficits which impact ability to perform ADLs (bathing, care of mouth/teeth, toileting, grooming, dressing, etc )  - Assess/evaluate cause of self-care deficits   - Assess range of motion  - Assess patient's mobility; develop plan if impaired  - Assess patient's need for assistive devices and provide as appropriate  - Encourage maximum independence but intervene and supervise when necessary  - Involve family in performance of ADLs  - Assess for home care needs following discharge   - Consider OT consult to assist with ADL evaluation and planning for discharge  - Provide patient education as appropriate  Outcome: Progressing  Goal: Maintain or return mobility status to optimal level  Description  INTERVENTIONS:  - Assess patient's baseline mobility status (ambulation, transfers, stairs, etc )    - Identify cognitive and physical deficits and behaviors that affect mobility  - Identify mobility aids required to assist with transfers and/or ambulation (gait belt, sit-to-stand, lift, walker, cane, etc )  - [place] fall precautions as indicated by assessment  - Record patient progress and toleration of activity level on Mobility SBAR; progress patient to next Phase/Stage  - Instruct patient to call for assistance with activity based on assessment  - Consider rehabilitation consult to assist with strengthening/weightbearing, etc   Outcome: Progressing     Problem: DISCHARGE PLANNING  Goal: Discharge to home or other facility with appropriate resources  Description  INTERVENTIONS:  - Identify barriers to discharge w/patient and caregiver  - Arrange for needed discharge resources and transportation as appropriate  - Identify discharge learning needs (meds, wound care, etc )  - Arrange for interpretive services to assist at discharge as needed  - Refer to Case Management Department for coordinating discharge planning if the patient needs post-hospital services based on physician/advanced practitioner order or complex needs related to functional status, cognitive ability, or social support system  Outcome: Progressing     Problem: Knowledge Deficit  Goal: Patient/family/caregiver demonstrates understanding of disease process, treatment plan, medications, and discharge instructions  Description  Complete learning assessment and assess knowledge base    Interventions:  - Provide teaching at level of understanding  - Provide teaching via preferred learning methods  Outcome: Progressing

## 2019-10-31 NOTE — DISCHARGE SUMMARY
Discharge- Lance Rodriguez 1955, 60 y o  male MRN: [id number]    Unit/Bed#: [facility] 708-01 Encounter: [id number]    Primary Care Provider: Sharri Raphael DO   Date and time admitted to hospital: 10/28/2019  3:08 PM        * Intractable chronic migraine without aura and without status migrainosus  Assessment & Plan  · Patient with long history of chronic migraines direct admitted from his neurologist  · Reports pain is a chronic 5/10 headache that resumed approximately 1 week after last ketamine drip on 08/26/2019  · Admitted for ketamine drip; hold home opiates, drip increased on 10/29, titrate off ketamine drip today  Patient tolerated well  · Constipation presumed secondary to opiates, abdominal imaging- normal bowel gas pattern  · Started MiraLax daily  · Neurology following, appreciate management of migraine  · Can discharge patient home today if able to titrate off ketamine drip per Neurology  Discontinue home regimens on discharge and follow-up with primary Neurology in 1 week  · Discharge patient home today  Continue home medications  Follow-up Neurology and PCP in 1 week  · Continue MiraLax daily on discharge  Mixed sleep apnea  Assessment & Plan  · CPAP p r n  Insomnia  Assessment & Plan  · Continue home dose of melatonin,and Seroquel q h s  Discharging Physician / Practitioner: ILYA Herrera  PCP: Sharri Raphael DO  Admission Date: 10/28/2019  Discharge Date: 10/31/19    Reason for Admission: No chief complaint on file          Resolved Problems  Date Reviewed: 10/31/2019    None          Consultations During Hospital Stay:  IP CONSULT TO NEUROLOGY    Procedures Performed:     · none    Significant Findings / Test Results:     · none  Results from last 7 days   Lab Units 10/30/19  0633 10/29/19  0455 10/28/19  1713   WBC Thousand/uL 5 43 5 75 5 21   HEMOGLOBIN g/dL 13 9 13 5 14 7   PLATELETS Thousands/uL 192 189 218     Results from last 7 days   Lab Units 10/30/19  4773 10/29/19  0455 10/28/19  1713   SODIUM mmol/L 139 142 140   POTASSIUM mmol/L 3 8 3 9 4 4   CHLORIDE mmol/L 108 108 107   CO2 mmol/L 24 27 26   BUN mg/dL 17 24 22   CREATININE mg/dL 0 88 1 08 1 18   CALCIUM mg/dL 9 1 9 1 9 3   TOTAL BILIRUBIN mg/dL  --   --  0 50   ALK PHOS U/L  --   --  55   ALT U/L  --   --  34   AST U/L  --   --  34             No results found for: HGBA1C          Blood Culture: No results found for: BLOODCX  Urine Culture: No results found for: URINECX  Sputum Culture: No components found for: SPUTUMCX  Wound Culture: No results found for: WOUNDCULT     XR abdomen obstruction series   Final Result by Kyle Rob MD (10/29 1522)      Nonobstructive bowel gas pattern  Workstation performed: [id number]                Incidental Findings:   ·  none    Test Results Pending at Discharge (will require follow up):   · none     Outpatient Tests Requested:  · none    Complications:  none    Reason for Admission: No chief complaint on file  Hospital Course:     Per HPI: Treva Flores is a 60 y o  male patient with a PMH of migraine, depression/anxiety, insomnia who originally presented to the hospital on 10/28/2019 due to intractable migraine headache  Patient was admitted for ketamine drip  Migraine headache improved with ketamine drip  Titrated off ketamine drip today with good tolerance  Patient is cleared for discharge by Neurology  Recommend continue home medications for migraine  Follow-up primary Neurology in 1 week  Spoke to patient at bedside, all questions answered  Hospital Course: Please see above list of diagnoses and related plan for additional information  Condition at Discharge: fair       Discharge Day Visit / Exam:     Subjective:  Patient reports headache 2-3/10  Denies chest pain, dizziness, nausea, vomiting, diarrhea, constipation, fever or chills  Ready to go home    Vitals: Blood Pressure: 130/64 (10/31/19 1515)  Pulse: 100 (10/31/19 1515)  Temperature: 99 2 °F (37 3 °C) (10/31/19 1515)  Respirations: 20 (10/31/19 1515)  SpO2: 96 % (10/31/19 1515)  Exam:   Physical Exam   Constitutional: He is oriented to person, place, and time  He appears well-developed and well-nourished  HENT:   Head: Normocephalic and atraumatic  Neck: Normal range of motion  Neck supple  No JVD present  No tracheal deviation present  No thyromegaly present  Cardiovascular: Normal rate and regular rhythm  No murmur heard  Pulmonary/Chest: Effort normal and breath sounds normal  No respiratory distress  He has no wheezes  He has no rales  Abdominal: Soft  Bowel sounds are normal  He exhibits no distension  There is no tenderness  There is no guarding  Musculoskeletal: Normal range of motion  He exhibits no edema or deformity  Neurological: He is alert and oriented to person, place, and time  Skin: Skin is warm and dry  Psychiatric: He has a normal mood and affect  Judgment normal    Nursing note and vitals reviewed  Discharge instructions/Information to patient and family:   See after visit summary for information provided to patient and family  Provisions for Follow-Up Care:  See after visit summary for information related to follow-up care and any pertinent home health orders  Disposition:     Home    Planned Readmission: no     Discharge Statement:  I spent 35 minutes discharging the patient  This time was spent on the day of discharge  I had direct contact with the patient on the day of discharge  Greater than 50% of the total time was spent examining patient, answering all patient questions, arranging and discussing plan of care with patient as well as directly providing post-discharge instructions  Additional time then spent on discharge activities  Discharge Medications:  See after visit summary for reconciled discharge medications provided to patient and family        ** Please Note: This note has been constructed using a voice recognition system **

## 2019-10-31 NOTE — ASSESSMENT & PLAN NOTE
· Patient with long history of chronic migraines direct admitted from his neurologist  · Reports pain is a chronic 5/10 headache that resumed approximately 1 week after last ketamine drip on 08/26/2019  · Admitted for ketamine drip; hold home opiates, drip increased 10/29  · Constipation presumed secondary to opiates, abdominal imaging- normal bowel gas pattern  · Start MiraLax daily    · Neurology following, appreciate management of migraine

## 2019-10-31 NOTE — PROGRESS NOTES
PATIENT NAME: Sammie Campuzano,  YOB: 1955  MEDICAL RECORD NUMBER: [id number]  Neurology Follow up Note     Overnight Events: No acute events overnight  Pt states that he was able to sleep better throughout the night  Subjective: Patient feels well today  He describes his pain as currently 1-2/10  Pt is on 0 3 mg/kg/hr of ketamine infusion currently  Will be weaned off  He is eating well  Has no other complaints currently  Pt follows up with Dr Taina Doe for chronic migraines every week  He has an upcoming appointment scheduled  12 point ROS negative for any changes  Scheduled Meds:    Current Facility-Administered Medications:  divalproex sodium 500 mg Oral Q12H [address] Slade Fernandez MD    enoxaparin 40 mg Subcutaneous Daily Slade Fernandez MD    gabapentin 300 mg Oral 4x Daily Slade Fernandez MD    ketamine 0 3 mg/kg/hr Intravenous Continuous Arleth Brooks PA-C Last Rate: 0 3 mg/kg/hr (10/31/19 0517)   loratadine 10 mg Oral Daily Slade Fernandez MD    magnesium gluconate 500 mg Oral BID AC Slade Fernandez MD    melatonin 3 mg Oral HS Slade Fernandez MD    QUEtiapine 50 mg Oral HS Slade Fernandez MD    venlafaxine 37 5 mg Oral Daily Slade Fernandez MD      Continuous Infusions:    ketamine 0 3 mg/kg/hr Last Rate: 0 3 mg/kg/hr (10/31/19 0517)     PRN Meds:  Objective  /89   Pulse 81   Temp 97 7 °F (36 5 °C)   Resp 16   SpO2 98%   General Examination: In no apparent distress, well developed and well nourished, and cooperative   HEENT: Normocephalic, Atraumatic  Moist mucus membranes     CVS: Regular rate and rhythm  S1 S2 noted  No audible murmurs  No carotids bruits  Peripheral pulses palpable throughout   Lungs: Clear to auscultation bilaterally  No rales, rhonchi, wheezing  Abdomen: Bowel sounds positive  Non- tender  Non-distended  No organomegaly  Ext: No edema   Psych: Thought content - No VH/AH  No delusions   Though Process - logical    Skin - No rash     Neurological Examination: Mental Status: The patient was awake, alert, attentive, oriented to person, place, and time  Recent and remote memory intact to conversation with no evidence of language dysfunction  Satisfactory fund of knowledge  Normal attention span and concentration      Cranial Nerves:   I: smell Not tested   II: visual fields Full to confrontation  Pupils equal, round, reactive to light with normal accomodation  III,IV,VI: extraocular muscles EOMI, no nystagmus   V: masseter and pterygoid strength full  Sensation in the V1 through V3 distributions intact to pinprick and light touch bilaterally  VII: Face is symmetric with no weakness noted  VIII: Audition intact to finger rub bilaterally  IX/X: Uvula midline  Soft palate elevation symmetric  XI: Trapezius and SCM strength 5/5 B/L  XII: Tongue midline with no atrophy or fasciculations with appropriate movement       Motor Examination:   No pronator drift  Bulk: Normal  No atrophy Tone: Normal  Fasciculations: None                   Deltoid Biceps Triceps WE   WF   FF IO     Right        5         5          8         5      5      5   5        Left           1        5          2          7      5     5 Mi-sun Krunal                        IP        Quad   Ham     TA       Gastroc   Right      5            5          3         5                5  Left         6            5         5         5                5            Reflexes:                   CIIILD Brachioradialis Triceps Patella Achilles   Right          3+            2+                  2+        2+       2+           Left            2+             2+                 2+         2+       2+              Clonus: None     Pathological Reflexes:  Babinsky: negative        Coordination: Patient able to perform normal finger-to-nose and heel to shin appropriately   Normal rapid alternating movements       Sensory: Normal sensation to light touch, pin prick and vibratory sensation throughout       Gait: not attempted today  No results found for this or any previous visit (from the past 24 hour(s))  A/P  60 y o  yo M with chronic migraines here for ketamine infusion  Pt is clinically stable and improving  He currently describes his pain as 1-2/10  Pt is to be weaned off ketamine  Neurological Disposition: Wean ketamine 0 3 mg/kg/hr over 8-12 hour period  Pt has follow up appointment scheduled with Dr Areli Hutchinson

## 2019-10-31 NOTE — PROGRESS NOTES
Progress Note - Chloe Marrow 1955, 60 y o  male MRN: [id number]    Unit/Bed#: [facility] 708-01 Encounter: [id number]    Primary Care Provider: Laurian Runner, DO   Date and time admitted to hospital: 10/28/2019  3:08 PM        * Intractable chronic migraine without aura and without status migrainosus  Assessment & Plan  · Patient with long history of chronic migraines direct admitted from his neurologist  · Reports pain is a chronic 5/10 headache that resumed approximately 1 week after last ketamine drip on 2019  · Admitted for ketamine drip; hold home opiates, drip increased 10/29  · Constipation presumed secondary to opiates, abdominal imaging- normal bowel gas pattern  · Start MiraLax daily  · Neurology following, appreciate management of migraine        Mixed sleep apnea  Assessment & Plan  · CPAP p r n  Insomnia  Assessment & Plan  · Continue home dose of melatonin,and Seroquel q h s  VTE Pharmacologic Prophylaxis:   Pharmacologic: Enoxaparin (Lovenox)  Mechanical VTE Prophylaxis in Place: No    Patient Centered Rounds: I have performed bedside rounds with nursing staff today  Discussions with Specialists or Other Care Team Provider: yes    Education and Discussions with Family / Patient: yes    Time Spent for Care: 20 minutes  More than 50% of total time spent on counseling and coordination of care as described above  Current Length of Stay: 3 day(s)    Current Patient Status: Inpatient   Certification Statement: The patient will continue to require additional inpatient hospital stay due to Migraine headache    Discharge Plan:  Discharge home in 24 - 48 hours    Code Status: Level 1 - Full Code      Subjective:   Patient reports headache 2-3/10 currently  Denies nausea, vomiting  Denies chest pain, dizziness, SOB, abdominal pain, fever, chills  Reports finger joints pain      Objective:     Vitals:   Temp (24hrs), Av 4 °F (36 9 °C), Min:97 7 °F (36 5 °C), Max:99 1 °F (37 3 °C)    Temp:  [97 7 °F (36 5 °C)-99 1 °F (37 3 °C)] 97 7 °F (36 5 °C)  HR:  [81-95] 81  Resp:  [16-22] 16  BP: (143-158)/() 143/89  SpO2:  [96 %-98 %] 98 %  There is no height or weight on file to calculate BMI  Input and Output Summary (last 24 hours): Intake/Output Summary (Last 24 hours) at 10/31/2019 1031  Last data filed at 10/30/2019 1700  Gross per 24 hour   Intake 180 ml   Output --   Net 180 ml       Physical Exam:     Physical Exam   Constitutional: He is oriented to person, place, and time  He appears well-developed and well-nourished  HENT:   Head: Normocephalic and atraumatic  Neck: Normal range of motion  Neck supple  No JVD present  No tracheal deviation present  No thyromegaly present  Cardiovascular: Normal rate and regular rhythm  No murmur heard  Pulmonary/Chest: Effort normal and breath sounds normal  No respiratory distress  He has no wheezes  He has no rales  Abdominal: Soft  Bowel sounds are normal  He exhibits no distension  There is no tenderness  There is no guarding  Musculoskeletal: Normal range of motion  He exhibits no edema, tenderness or deformity  Neurological: He is alert and oriented to person, place, and time  Skin: Skin is warm and dry  Psychiatric: He has a normal mood and affect  Judgment normal    Nursing note and vitals reviewed  Additional Data:     Labs:    Results from last 7 days   Lab Units 10/30/19  0633   WBC Thousand/uL 5 43   HEMOGLOBIN g/dL 13 9   HEMATOCRIT % 42 0   PLATELETS Thousands/uL 192   NEUTROS PCT % 48   LYMPHS PCT % 34   MONOS PCT % 12   EOS PCT % 4     Results from last 7 days   Lab Units 10/30/19  0633  10/28/19  1713   POTASSIUM mmol/L 3 8   < > 4 4   CHLORIDE mmol/L 108   < > 107   CO2 mmol/L 24   < > 26   BUN mg/dL 17   < > 22   CREATININE mg/dL 0 88   < > 1 18   CALCIUM mg/dL 9 1   < > 9 3   ALK PHOS U/L  --   --  55   ALT U/L  --   --  34   AST U/L  --   --  34    < > = values in this interval not displayed  * I Have Reviewed All Lab Data Listed Above  * Additional Pertinent Lab Tests Reviewed: Aiden 66 Admission Reviewed    Imaging:    Imaging Reports Reviewed Today Include:  None  Imaging Personally Reviewed by Myself Includes:  None    Recent Cultures (last 7 days):           Last 24 Hours Medication List:     Current Facility-Administered Medications:  divalproex sodium 500 mg Oral Q12H [facility] Josr Ram MD    enoxaparin 40 mg Subcutaneous Daily Josr Ram MD    gabapentin 300 mg Oral 4x Daily Josr Ram MD    ketamine 0 3 mg/kg/hr Intravenous Continuous Skip FANG Rincon Last Rate: 0 3 mg/kg/hr (10/31/19 0517)   loratadine 10 mg Oral Daily Josr Ram MD    magnesium gluconate 500 mg Oral BID AC Josr Ram MD    melatonin 3 mg Oral HS Josr Ram MD    polyethylene glycol 17 g Oral Daily ILYA Fritz    QUEtiapine 50 mg Oral HS Josr Ram MD    senna 2 tablet Oral HS ILYA Fritz    venlafaxine 37 5 mg Oral Daily Josr Ram MD         Today, Patient Was Seen By: ILYA Duron    ** Please Note: Dragon 360 Dictation voice to text software may have been used in the creation of this document   **

## 2019-10-31 NOTE — ASSESSMENT & PLAN NOTE
· Patient with long history of chronic migraines direct admitted from his neurologist  · Reports pain is a chronic 5/10 headache that resumed approximately 1 week after last ketamine drip on 08/26/2019  · Admitted for ketamine drip; hold home opiates, drip increased on 10/29, titrate off ketamine drip today  Patient tolerated well  · Constipation presumed secondary to opiates, abdominal imaging- normal bowel gas pattern  · Started MiraLax daily  · Neurology following, appreciate management of migraine  · Can discharge patient home today if able to titrate off ketamine drip per Neurology  Discontinue home regimens on discharge and follow-up with primary Neurology in 1 week  · Discharge patient home today  Continue home medications  Follow-up Neurology and PCP in 1 week  · Continue MiraLax daily on discharge

## 2019-11-01 DIAGNOSIS — G43.709 CHRONIC MIGRAINE WITHOUT AURA WITHOUT STATUS MIGRAINOSUS, NOT INTRACTABLE: ICD-10-CM

## 2019-11-01 RX ORDER — QUETIAPINE FUMARATE 25 MG/1
TABLET, FILM COATED ORAL
Qty: 60 TABLET | Refills: 0 | Status: SHIPPED | OUTPATIENT
Start: 2019-11-01 | End: 2020-04-08 | Stop reason: SDUPTHER

## 2019-11-01 RX ORDER — CYCLOBENZAPRINE HCL 10 MG
TABLET ORAL
Qty: 60 TABLET | Refills: 0 | Status: SHIPPED | OUTPATIENT
Start: 2019-11-01 | End: 2020-04-08 | Stop reason: ALTCHOICE

## 2019-11-01 RX ORDER — PROCHLORPERAZINE MALEATE 10 MG
TABLET ORAL
Qty: 20 TABLET | Refills: 0 | Status: SHIPPED | OUTPATIENT
Start: 2019-11-01 | End: 2020-04-08 | Stop reason: ALTCHOICE

## 2019-11-01 NOTE — UTILIZATION REVIEW
Notification of Discharge  This is a Notification of Discharge from our facility [address]  Please be advised that this patient has been discharge from our facility  Below you will find the admission and discharge date and time including the patients disposition  PRESENTATION DATE: 10/28/2019  3:08 PM  OBS ADMISSION DATE:   IP ADMISSION DATE: 10/28/19 1508   DISCHARGE DATE: 10/31/2019  5:28 PM  DISPOSITION: Home/Self Care Home/Self Care   Admission Orders listed below:  Admission Orders (From admission, onward)     Ordered        10/29/19 1048  Inpatient Admission  Once         10/28/19 1617  Place in Observation  Once                   Please contact the UR Department if additional information is required to close this patient's authorization/case  Network Utilization Review Department  [email or web address]  Main: [phone]  ATTENTION: Please call with any questions or concerns to [phone] and carefully listen to the prompts so that you are directed to the right person  All voicemails are confidential   [place] Gage all requests for admission clinical reviews, approved or denied determinations and any other requests to dedicated fax number below belonging to the campus where the patient is receiving treatment    FACILITY NAME UR FAX NUMBER   ADMISSION DENIALS (Administrative/Medical Necessity) [address] [facility] (Maternity/NICU/Pediatrics) [phone]   [facility] [address] [facility] [phone]   [place] [address] [facility] [phone]   Latosha Jacob [address] [place] [phone]

## 2019-11-11 ENCOUNTER — TELEPHONE (OUTPATIENT)
Dept: NEUROLOGY | Facility: CLINIC | Age: 64
End: 2019-11-11

## 2019-11-11 NOTE — TELEPHONE ENCOUNTER
MD Kaylan Ocasio MA             If you have time next week can you please follow up on this pt  He was discharged last week and not sure how is doing now after his second ketamine treatment   thx

## 2019-11-11 NOTE — TELEPHONE ENCOUNTER
Called patient and left a message to call back to discuss how he is doing since his recent Ketamine infusion

## 2019-11-26 DIAGNOSIS — G43.709 CHRONIC MIGRAINE WITHOUT AURA WITHOUT STATUS MIGRAINOSUS, NOT INTRACTABLE: ICD-10-CM

## 2019-11-26 RX ORDER — DIVALPROEX SODIUM 250 MG/1
TABLET, DELAYED RELEASE ORAL
Qty: 120 TABLET | Refills: 0 | Status: SHIPPED | OUTPATIENT
Start: 2019-11-26 | End: 2020-04-08 | Stop reason: ALTCHOICE

## 2019-12-06 ENCOUNTER — TELEPHONE (OUTPATIENT)
Dept: NEUROLOGY | Facility: CLINIC | Age: 64
End: 2019-12-06

## 2019-12-06 NOTE — TELEPHONE ENCOUNTER
Received a voicemail from [employer]  The representative states that they are calling to set up delivery for the patient's Botox order      Please advise    Thanks    Mery Moreno

## 2019-12-09 ENCOUNTER — TELEPHONE (OUTPATIENT)
Dept: NEUROLOGY | Facility: CLINIC | Age: 64
End: 2019-12-09

## 2019-12-09 NOTE — TELEPHONE ENCOUNTER
Prior auth request has been submitted to [facility] and referral attached to the apt

## 2019-12-09 NOTE — TELEPHONE ENCOUNTER
Received a call back from Jori Brown - she informed me that the pt's note dated 10/7/19 has no information on how pt is doing on Botox and it also states that the pt has not had any Botox injection  I faxed her office note dated 9/5/19 to [phone] with ref# [id number] as requested by Jori Brown    Request also faxed to

## 2019-12-09 NOTE — TELEPHONE ENCOUNTER
Called Care Site and spoke to Jackie - she informed me [facility]  I informed her that a request was submitted today  Called Allegra Ward and left her a vm to call me back

## 2019-12-09 NOTE — TELEPHONE ENCOUNTER
General 12/09/2019 10:18 AM Julio Cesar Wolfe MA CARE COORDINATION -   Note    BOTOX 200 UNITS - P (REQUEST FAXED TO Viola Olvera), CARE SITE

## 2019-12-09 NOTE — TELEPHONE ENCOUNTER
Received a call from Joey at [facility]  She state she needs additional information in regards to this patient  She states she sees a claim for [facility] and needs to know if the patient is currently taking this medication  She also states this authorization was previously approved in June and has not been used  She states they have a tight time frame for approvals with [facility] and will need a call back as soon as possible      Direct phone number: [phone]

## 2019-12-10 NOTE — TELEPHONE ENCOUNTER
[place] and spoke to Samantha Lund - YUDI requested to have the prior auth start date moved to at least one week earlier in order for there to be enough time to order Botox from [facility]  She confirmed that she would

## 2019-12-11 NOTE — TELEPHONE ENCOUNTER
Called [facility] and spoke to [employer] - she informed me that the prior Sayda Reyes has not been updated as of yet

## 2019-12-13 NOTE — TELEPHONE ENCOUNTER
Called [facility] and spoke to [employer] - she informed me that the prior Darol Cleverly has not been updated as of yet    Called [facility] and spoke to [facility] [facility] - she informed me that the prior Darol Cleverly has been addended to 01/01/20 till 01/01/21  She informed me that we will not be getting a copy of the updated auth, however it has been updated in their system

## 2020-01-06 NOTE — TELEPHONE ENCOUNTER
Called [facility] and spoke to [place] - Botox delivery confirmed for tomorrow 1/7/20 via UPS air signature required to [facility] location [address]    Please await Botox delivery  Thank you!     Nancy

## 2020-01-08 ENCOUNTER — PROCEDURE VISIT (OUTPATIENT)
Dept: NEUROLOGY | Facility: CLINIC | Age: 65
End: 2020-01-08
Payer: COMMERCIAL

## 2020-01-08 VITALS — SYSTOLIC BLOOD PRESSURE: 144 MMHG | DIASTOLIC BLOOD PRESSURE: 96 MMHG | TEMPERATURE: 98 F | HEART RATE: 96 BPM

## 2020-01-08 DIAGNOSIS — G43.709 CHRONIC MIGRAINE WITHOUT AURA WITHOUT STATUS MIGRAINOSUS, NOT INTRACTABLE: Primary | ICD-10-CM

## 2020-01-08 PROCEDURE — 64615 CHEMODENERV MUSC MIGRAINE: CPT | Performed by: PSYCHIATRY & NEUROLOGY

## 2020-01-08 NOTE — PROGRESS NOTES
Chemodenervation  Date/Time: 1/8/2020 10:40 AM  Performed by: Neil Ortiz MD  Authorized by: Neil Ortiz MD     Pre-procedure details:     Prepped With: Alcohol    Anesthesia (see MAR for exact dosages): Anesthesia method:  None  Procedure details:     Position:  Upright  Botox:     Botox Type:  Type A    Brand:  Botox    Final Concentration per CC:  200 units    Needle Gauge:  30 G 2 5 inch  Post-procedure details:     Chemodenervation:  Chronic migraine    Facial Nerve Location[de-identified]  Bilateral facial nerve    Patient tolerance of procedure: Tolerated well, no immediate complications      Procedures:     Botox Procedures: chronic headache      Indications: migraines      Injection Location:     Head / Face:  L superior cervical paraspinal, R superior cervical paraspinal, L , R , L frontalis, R frontalis, L medial occipitalis, R medial occipitalis, procerus, R temporalis, L temporalis, R superior trapezius and L superior trapezius      L  injection amount:  5 unit(s)    R  injection amount:  5 unit(s)    Procerus injection amount:  5 unit(s)      L lateral frontalis:  5 unit(s)    R lateral frontalis:  5 unit(s)    L medial frontalis:  5 unit(s)    R medial frontalis:  5 unit(s)      L temporalis injection amount:  20 unit(s)    R temporalis injection amount:  20 unit(s)      L medial occipitalis injection amount:  15 unit(s)    R medial occipitalis injection amount:  15 unit(s)      L superior cervical paraspinal injection amount:  10 unit(s)    R superior cervical paraspinal injection amount:  10 unit(s)      L superior trapezius injection amount:  15 unit(s)    R superior trapezius injection amount:  15 unit(s)    45 units biparietal and bioccipital , which was medically necessary      Total Units:     Total units used:  200    Total units discarded:  0    Post-procedure details:     Chemodenervation:  Chronic migraine    Facial Nerve Location[de-identified]  Bilateral facial nerve    Patient tolerance of procedure:  Tolerated well, no immediate complications

## 2020-01-27 ENCOUNTER — TELEPHONE (OUTPATIENT)
Dept: NEUROLOGY | Facility: CLINIC | Age: 65
End: 2020-01-27

## 2020-01-27 NOTE — TELEPHONE ENCOUNTER
Spoke with patient regarding Ketamine nasal spray per request of Dr Yessica Rosario  Patient will be continuing his percocet use along with the Ketamine spray        01/02/2020  1   12/30/2019  Hydrocodone-Acetamin  MG  180 00 30 Ma Mur   [id number]   Fam (9515)   0  60 00 MME  [place]   12/03/2019  1   11/27/2019  Hydrocodone-Acetamin  MG  120 00 30 Ma Mur   [id number]   Fam (9515)   0  40 00 MME  [place] Ins   [place]   11/04/2019  1   11/04/2019  Hydrocodone-Acetamin  MG  180 00 30 Pa Mur   [id number]   Fam (9515)   0  60 00 MME  [place] Ins   [place]   10/07/2019  1   10/07/2019  Clonazepam 1 MG Odt  30 00 30 Bu Mal   [id number]   Fam (9515)   0   Comm Ins   [place]   09/30/2019  1   09/30/2019  Hydrocodone-Acetamin  MG  180 00 30 Ma Mur   [id number]   Fam (9515)   0  60 00 MME  [place] Ins   [place]   09/23/2019  1   09/23/2019  Clonazepam 0 25 MG Odt  60 00 20 Bu Mal   [id number]   Fam (9515)   0   [place] Ins   [place]   08/28/2019  1   08/22/2019  Hydrocodone-Acetamin  MG  180 00 30 Kr Ler   [id number]   Fam (9515)   0  60 00 MME  Comm Ins   [place]   08/28/2019  1   08/22/2019  Fentanyl 25 Mcg/Hr Patch  10 00 30 Kr Ler   [id number]   Fam (9515)   0  60 00 MME  Comm Ins   [place]   07/29/2019  1   07/25/2019  Fentanyl 25 Mcg/Hr Patch  10 00 30 Kr Ler   [id number]   Fam (9515)   0  60 00 MME  Comm Ins   [place]   07/29/2019  1   07/25/2019  Hydrocodone-Acetamin  MG  180 00 30 Kr Ler   [id number]   Fam (9515)   0  60 00 MME  Comm Ins   [place]   06/27/2019  1   06/26/2019  Fentanyl 25 Mcg/Hr Patch  10 00 30 Ma Mur   [id number]   Fam (9515)   0  60 00 MME  Comm Ins   [place]   06/27/2019  1   06/26/2019  Hydrocodone-Acetamin  MG  180 00 30 Ma Mur   [id number]   Fam (9515)   0  60 00 MME  Comm Ins   [place]   05/30/2019  1   05/28/2019  Fentanyl 25 Mcg/Hr Patch  10 00 30 Kr Ler   [id number]   Fam (9515)   0  60 00 MME  Comm Ins   [place]   05/28/2019  1   05/24/2019  Hydrocodone-Acetamin  MG  180 00 30 Kr Ler   [id number]   Fam (9515)   0  60 00 MME  Comm Ins   [place] 04/26/2019  1   04/26/2019  Hydrocodone-Acetamin  MG  180 00 30 Kr Ler   [id number]   Fam (9515)   0  60 00 MME  Comm Ins   [place]   04/25/2019  1   02/13/2019  Duragesic 50 Mcg/Hr Patch  20 00 30 Ma Mur   [id number]   Fam (9515)   0  240 00 MME  Comm Ins   [place]   03/18/2019  1   03/15/2019  Hydrocodone-Acetamin  MG  180 00 30 Ma Mur   [id number]   Fam (9515)   0  60 00 MME  Comm Ins   [place]   02/16/2019  1   02/13/2019  Hydrocodone-Acetamin  MG  180 00 30 Ma Mur   [id number]   Fam (9515)   0  60 00 MME  Private Pay   [place]   01/17/2019  1   01/14/2019  Duragesic 50 Mcg/Hr Patch  20 00 30 Ma Mur   [id number]   Fam (9515)   0  240 00 MME  Comm Ins   [place]     He does get #120-180 percocet  mg monthly from the pharmacy    The prescription should be sent into or a verbal to   [address] [place] [address]

## 2020-02-20 NOTE — TELEPHONE ENCOUNTER
[facility] calling office to inform us that the patient has been approved for Botox 200 units   Effective starting 1/8/2020-1/8/2021 Transitions of Care Status:  1.  Name of PCP:  2.  PCP Contacted on Admission: [ ] Y    [ ] N    3.  PCP contacted at Discharge: [ ] Y    [ ] N    [ ] N/A  4.  Post-Discharge Appointment Date and Location:  5.  Summary of Handoff given to PCP:

## 2020-03-10 ENCOUNTER — TELEPHONE (OUTPATIENT)
Dept: NEUROLOGY | Facility: CLINIC | Age: 65
End: 2020-03-10

## 2020-03-10 NOTE — TELEPHONE ENCOUNTER
Type Date User Summary Attachment   General 03/10/2020 11:14 AM Tenna Phoenix care coordination  -   Note    Botox- authorization #: [id number]- 2nd visit- valid from 1/8/2020 until 1/8/2021   Please use [facility]      Thank you,     Blaise Garvin

## 2020-03-11 NOTE — TELEPHONE ENCOUNTER
Called [facility] to initiate refill request for patient's Botox and spoke with rep Kaity Donovan  Test claim ran but received too soon to fill and unable to fill until 3/18/2020  Scheduled delivery of Botox 200 units sdv qty 1 to [facility] office for Thursday 3/19/2020 with signature required  I will await delivery

## 2020-04-08 ENCOUNTER — PROCEDURE VISIT (OUTPATIENT)
Dept: NEUROLOGY | Facility: CLINIC | Age: 65
End: 2020-04-08
Payer: COMMERCIAL

## 2020-04-08 VITALS — SYSTOLIC BLOOD PRESSURE: 136 MMHG | HEART RATE: 101 BPM | TEMPERATURE: 98.2 F | DIASTOLIC BLOOD PRESSURE: 85 MMHG

## 2020-04-08 DIAGNOSIS — G43.709 CHRONIC MIGRAINE WITHOUT AURA WITHOUT STATUS MIGRAINOSUS, NOT INTRACTABLE: Primary | ICD-10-CM

## 2020-04-08 PROCEDURE — 64615 CHEMODENERV MUSC MIGRAINE: CPT | Performed by: PSYCHIATRY & NEUROLOGY

## 2020-04-08 RX ORDER — QUETIAPINE FUMARATE 50 MG/1
TABLET, FILM COATED ORAL
Qty: 30 TABLET | Refills: 6 | Status: SHIPPED | OUTPATIENT
Start: 2020-04-08 | End: 2020-05-04 | Stop reason: ALTCHOICE

## 2020-04-13 ENCOUNTER — TELEPHONE (OUTPATIENT)
Dept: NEUROLOGY | Facility: CLINIC | Age: 65
End: 2020-04-13

## 2020-05-04 ENCOUNTER — TELEMEDICINE (OUTPATIENT)
Dept: NEUROLOGY | Facility: CLINIC | Age: 65
End: 2020-05-04
Payer: COMMERCIAL

## 2020-05-04 ENCOUNTER — TELEPHONE (OUTPATIENT)
Dept: NEUROLOGY | Facility: CLINIC | Age: 65
End: 2020-05-04

## 2020-05-04 DIAGNOSIS — M54.2 CERVICALGIA: ICD-10-CM

## 2020-05-04 DIAGNOSIS — F11.20 UNCOMPLICATED OPIOID DEPENDENCE (HCC): Chronic | ICD-10-CM

## 2020-05-04 DIAGNOSIS — G43.719 INTRACTABLE CHRONIC MIGRAINE WITHOUT AURA AND WITHOUT STATUS MIGRAINOSUS: Primary | ICD-10-CM

## 2020-05-04 DIAGNOSIS — F51.01 PRIMARY INSOMNIA: ICD-10-CM

## 2020-05-04 PROCEDURE — 99214 OFFICE O/P EST MOD 30 MIN: CPT | Performed by: PSYCHIATRY & NEUROLOGY

## 2020-05-20 ENCOUNTER — HOSPITAL ENCOUNTER (OUTPATIENT)
Dept: CT IMAGING | Facility: HOSPITAL | Age: 65
Discharge: HOME | End: 2020-05-20
Attending: PSYCHIATRY & NEUROLOGY
Payer: COMMERCIAL

## 2020-05-20 DIAGNOSIS — G43.719 INTRACTABLE CHRONIC MIGRAINE WITHOUT AURA AND WITHOUT STATUS MIGRAINOSUS: ICD-10-CM

## 2020-05-20 PROCEDURE — 70450 CT HEAD/BRAIN W/O DYE: CPT

## 2020-06-02 ENCOUNTER — TELEPHONE (OUTPATIENT)
Dept: NEUROLOGY | Facility: CLINIC | Age: 65
End: 2020-06-02

## 2020-07-16 ENCOUNTER — PROCEDURE VISIT (OUTPATIENT)
Dept: NEUROLOGY | Facility: CLINIC | Age: 65
End: 2020-07-16
Payer: COMMERCIAL

## 2020-07-16 VITALS — SYSTOLIC BLOOD PRESSURE: 158 MMHG | TEMPERATURE: 98.8 F | DIASTOLIC BLOOD PRESSURE: 82 MMHG | HEART RATE: 104 BPM

## 2020-07-16 DIAGNOSIS — G43.709 CHRONIC MIGRAINE WITHOUT AURA WITHOUT STATUS MIGRAINOSUS, NOT INTRACTABLE: Primary | ICD-10-CM

## 2020-07-16 PROCEDURE — 64615 CHEMODENERV MUSC MIGRAINE: CPT | Performed by: PSYCHIATRY & NEUROLOGY

## 2020-07-16 NOTE — PROGRESS NOTES
Chemodenervation  Date/Time: 7/16/2020 2:13 PM  Performed by: Kvng Ovalle MD  Authorized by: Kvng Ovalle MD     Pre-procedure details:     Prepped With: Alcohol    Anesthesia (see MAR for exact dosages): Anesthesia method:  None  Procedure details:     Position:  Upright  Botox:     Botox Type:  Type A    Brand:  Botox    mL's of Botulinum Toxin:  200    Final Concentration per CC:  200 units    Needle Gauge:  30 G 2 5 inch  Total Units:     Total units used:  200    Total units discarded:  0  Post-procedure details:     Chemodenervation:  Chronic migraine    Facial Nerve Location[de-identified]  Bilateral facial nerve    Patient tolerance of procedure: Tolerated well, no immediate complications      Procedures:     Botox Procedures: chronic headache      Indications: migraines      Injection Location:     Head / Face:  L superior cervical paraspinal, R superior cervical paraspinal, L , R , L frontalis, R frontalis, L medial occipitalis, R medial occipitalis, procerus, R temporalis, L temporalis, R superior trapezius and L superior trapezius      L  injection amount:  5 unit(s)    R  injection amount:  5 unit(s)    Procerus injection amount:  5 unit(s)      L lateral frontalis:  5 unit(s)    R lateral frontalis:  5 unit(s)    L medial frontalis:  5 unit(s)    R medial frontalis:  5 unit(s)      L temporalis injection amount:  20 unit(s)    R temporalis injection amount:  20 unit(s)      L medial occipitalis injection amount:  15 unit(s)    R medial occipitalis injection amount:  15 unit(s)      L superior cervical paraspinal injection amount:  10 unit(s)    R superior cervical paraspinal injection amount:  10 unit(s)      L superior trapezius injection amount:  15 unit(s)    R superior trapezius injection amount:  15 unit(s)    45 units biparietal and bioccipital , which was medically necessary      Total Units:     Total units used:  200    Total units discarded:  0    Post-procedure details:     Chemodenervation:  Chronic migraine    Facial Nerve Location[de-identified]  Bilateral facial nerve    Patient tolerance of procedure:  Tolerated well, no immediate complications

## 2020-08-13 ENCOUNTER — TELEPHONE (OUTPATIENT)
Dept: NEUROLOGY | Facility: CLINIC | Age: 65
End: 2020-08-13

## 2020-08-13 NOTE — TELEPHONE ENCOUNTER
Called patient and left a message to call back  We had to cancel his 09/04/20 appointment due to Dr Celestine Jaramillo not being in the office that day  If the patient calls back please reschedule video visit

## 2020-08-31 NOTE — TELEPHONE ENCOUNTER
Called patient and left a message to call back  We had to cancel his 09/04/20 appointment due to Dr Allie Wright not being in the office that day  If the patient calls back please reschedule video visit

## 2020-09-16 ENCOUNTER — TELEPHONE (OUTPATIENT)
Dept: NEUROLOGY | Facility: CLINIC | Age: 65
End: 2020-09-16

## 2020-09-16 NOTE — TELEPHONE ENCOUNTER
General  09/16/2020 12:54 PM  Marie Del Rio MA  CARE [facility]  -    Note     BOTOX 200 UNITS (VISIT# 4) - AUTH# [id number]  0 VISITS, 4RTH visit- 1/8/2020 until 1/8/2021    [facility]

## 2020-09-16 NOTE — TELEPHONE ENCOUNTER
I received a call from David Martino with [facility] - Botox delivery confirmed for Tuesday 9/29/20 via UPS priority morning delivery to [facility] location [address]  Please await Botox delivery  Thank you!     Nancy

## 2020-09-23 NOTE — TELEPHONE ENCOUNTER
Botox number of units: 200 units   Botox quantity: 1  Arrived at what location: [facility]   Lot number: [id number]  Expiration Date: 05/2023

## 2020-09-29 ENCOUNTER — TELEPHONE (OUTPATIENT)
Dept: NEUROLOGY | Facility: CLINIC | Age: 65
End: 2020-09-29

## 2020-09-30 DIAGNOSIS — G43.709 CHRONIC MIGRAINE WITHOUT AURA WITHOUT STATUS MIGRAINOSUS, NOT INTRACTABLE: Primary | ICD-10-CM

## 2020-10-05 ENCOUNTER — HOSPITAL ENCOUNTER (INPATIENT)
Facility: HOSPITAL | Age: 65
LOS: 1 days | Discharge: HOME | DRG: 054 | End: 2020-10-05
Attending: INTERNAL MEDICINE | Admitting: INTERNAL MEDICINE
Payer: COMMERCIAL

## 2020-10-05 VITALS
WEIGHT: 176.59 LBS | RESPIRATION RATE: 18 BRPM | BODY MASS INDEX: 24.72 KG/M2 | OXYGEN SATURATION: 95 % | TEMPERATURE: 97.9 F | HEIGHT: 71 IN | SYSTOLIC BLOOD PRESSURE: 165 MMHG | HEART RATE: 87 BPM | DIASTOLIC BLOOD PRESSURE: 94 MMHG

## 2020-10-05 DIAGNOSIS — G43.719 INTRACTABLE CHRONIC MIGRAINE WITHOUT AURA AND WITHOUT STATUS MIGRAINOSUS: Primary | ICD-10-CM

## 2020-10-05 PROBLEM — R51.9 INTRACTABLE HEADACHE: Status: ACTIVE | Noted: 2020-10-05

## 2020-10-05 PROBLEM — F10.10 ALCOHOL ABUSE: Status: ACTIVE | Noted: 2020-10-05

## 2020-10-05 LAB
ANION GAP SERPL CALCULATED.3IONS-SCNC: 5 MMOL/L (ref 4–13)
BASOPHILS # BLD AUTO: 0.03 THOUSANDS/ΜL (ref 0–0.1)
BASOPHILS NFR BLD AUTO: 1 % (ref 0–1)
BUN SERPL-MCNC: 14 MG/DL (ref 5–25)
CALCIUM SERPL-MCNC: 9.5 MG/DL (ref 8.3–10.1)
CHLORIDE SERPL-SCNC: 104 MMOL/L (ref 100–108)
CO2 SERPL-SCNC: 28 MMOL/L (ref 21–32)
CREAT SERPL-MCNC: 1.05 MG/DL (ref 0.6–1.3)
EOSINOPHIL # BLD AUTO: 0.17 THOUSAND/ΜL (ref 0–0.61)
EOSINOPHIL NFR BLD AUTO: 4 % (ref 0–6)
ERYTHROCYTE [DISTWIDTH] IN BLOOD BY AUTOMATED COUNT: 12.3 % (ref 11.6–15.1)
GFR SERPL CREATININE-BSD FRML MDRD: 75 ML/MIN/1.73SQ M
GLUCOSE SERPL-MCNC: 125 MG/DL (ref 65–140)
HCT VFR BLD AUTO: 44.1 % (ref 36.5–49.3)
HGB BLD-MCNC: 15 G/DL (ref 12–17)
IMM GRANULOCYTES # BLD AUTO: 0.02 THOUSAND/UL (ref 0–0.2)
IMM GRANULOCYTES NFR BLD AUTO: 0 % (ref 0–2)
LYMPHOCYTES # BLD AUTO: 1.72 THOUSANDS/ΜL (ref 0.6–4.47)
LYMPHOCYTES NFR BLD AUTO: 35 % (ref 14–44)
MAGNESIUM SERPL-MCNC: 2.2 MG/DL (ref 1.6–2.6)
MCH RBC QN AUTO: 34.9 PG (ref 26.8–34.3)
MCHC RBC AUTO-ENTMCNC: 34 G/DL (ref 31.4–37.4)
MCV RBC AUTO: 103 FL (ref 82–98)
MONOCYTES # BLD AUTO: 0.44 THOUSAND/ΜL (ref 0.17–1.22)
MONOCYTES NFR BLD AUTO: 9 % (ref 4–12)
NEUTROPHILS # BLD AUTO: 2.5 THOUSANDS/ΜL (ref 1.85–7.62)
NEUTS SEG NFR BLD AUTO: 51 % (ref 43–75)
NRBC BLD AUTO-RTO: 0 /100 WBCS
PLATELET # BLD AUTO: 185 THOUSANDS/UL (ref 149–390)
PMV BLD AUTO: 9.5 FL (ref 8.9–12.7)
POTASSIUM SERPL-SCNC: 3.8 MMOL/L (ref 3.5–5.3)
RBC # BLD AUTO: 4.3 MILLION/UL (ref 3.88–5.62)
SODIUM SERPL-SCNC: 137 MMOL/L (ref 136–145)
WBC # BLD AUTO: 4.88 THOUSAND/UL (ref 4.31–10.16)

## 2020-10-05 PROCEDURE — NC001 PR NO CHARGE: Performed by: INTERNAL MEDICINE

## 2020-10-05 PROCEDURE — 99253 IP/OBS CNSLTJ NEW/EST LOW 45: CPT | Performed by: PSYCHIATRY & NEUROLOGY

## 2020-10-05 PROCEDURE — 80048 BASIC METABOLIC PNL TOTAL CA: CPT | Performed by: INTERNAL MEDICINE

## 2020-10-05 PROCEDURE — G0379 DIRECT REFER HOSPITAL OBSERV: HCPCS

## 2020-10-05 PROCEDURE — 83735 ASSAY OF MAGNESIUM: CPT | Performed by: INTERNAL MEDICINE

## 2020-10-05 PROCEDURE — 85025 COMPLETE CBC W/AUTO DIFF WBC: CPT | Performed by: INTERNAL MEDICINE

## 2020-10-05 PROCEDURE — 99236 HOSP IP/OBS SAME DATE HI 85: CPT | Performed by: INTERNAL MEDICINE

## 2020-10-05 RX ORDER — HYDROCODONE BITARTRATE AND ACETAMINOPHEN 5; 325 MG/1; MG/1
2 TABLET ORAL EVERY 6 HOURS PRN
Status: DISCONTINUED | OUTPATIENT
Start: 2020-10-05 | End: 2020-10-05 | Stop reason: HOSPADM

## 2020-10-05 RX ORDER — TAURINE 500 MG
1 CAPSULE ORAL DAILY
COMMUNITY

## 2020-10-05 RX ORDER — ASCORBIC ACID 500 MG
250 TABLET ORAL 2 TIMES DAILY
Status: DISCONTINUED | OUTPATIENT
Start: 2020-10-05 | End: 2020-10-05 | Stop reason: HOSPADM

## 2020-10-05 RX ORDER — FLUTICASONE PROPIONATE 50 MCG
1 SPRAY, SUSPENSION (ML) NASAL DAILY
Status: DISCONTINUED | OUTPATIENT
Start: 2020-10-05 | End: 2020-10-05 | Stop reason: HOSPADM

## 2020-10-05 RX ORDER — MELATONIN
1000 2 TIMES DAILY
Status: DISCONTINUED | OUTPATIENT
Start: 2020-10-05 | End: 2020-10-05 | Stop reason: HOSPADM

## 2020-10-05 RX ORDER — LORATADINE 10 MG/1
10 TABLET ORAL DAILY
Status: DISCONTINUED | OUTPATIENT
Start: 2020-10-05 | End: 2020-10-05 | Stop reason: HOSPADM

## 2020-10-05 RX ORDER — SACCHAROMYCES BOULARDII 250 MG
250 CAPSULE ORAL 2 TIMES DAILY
Status: DISCONTINUED | OUTPATIENT
Start: 2020-10-05 | End: 2020-10-05 | Stop reason: HOSPADM

## 2020-10-05 RX ORDER — LANOLIN ALCOHOL/MO/W.PET/CERES
3 CREAM (GRAM) TOPICAL
Status: DISCONTINUED | OUTPATIENT
Start: 2020-10-05 | End: 2020-10-05 | Stop reason: HOSPADM

## 2020-10-05 RX ORDER — DIPHENHYDRAMINE HCL 25 MG
25 TABLET ORAL
Status: DISCONTINUED | OUTPATIENT
Start: 2020-10-05 | End: 2020-10-05 | Stop reason: HOSPADM

## 2020-10-05 RX ADMIN — LORATADINE 10 MG: 10 TABLET ORAL at 14:29

## 2020-10-05 RX ADMIN — OXYCODONE HYDROCHLORIDE AND ACETAMINOPHEN 250 MG: 500 TABLET ORAL at 17:43

## 2020-10-05 RX ADMIN — HYDROCODONE BITARTRATE AND ACETAMINOPHEN 2 TABLET: 5; 325 TABLET ORAL at 17:43

## 2020-10-05 RX ADMIN — Medication 1000 UNITS: at 17:43

## 2020-10-05 RX ADMIN — FLUTICASONE PROPIONATE 1 SPRAY: 50 SPRAY, METERED NASAL at 14:30

## 2020-10-05 RX ADMIN — Medication 250 MG: at 17:43

## 2020-10-07 ENCOUNTER — TELEPHONE (OUTPATIENT)
Dept: NEUROLOGY | Facility: CLINIC | Age: 65
End: 2020-10-07

## 2020-10-12 ENCOUNTER — TELEPHONE (OUTPATIENT)
Dept: NEUROLOGY | Facility: CLINIC | Age: 65
End: 2020-10-12

## 2020-10-14 ENCOUNTER — HOSPITAL ENCOUNTER (INPATIENT)
Facility: HOSPITAL | Age: 65
LOS: 6 days | Discharge: HOME | DRG: 054 | End: 2020-10-20
Attending: INTERNAL MEDICINE | Admitting: INTERNAL MEDICINE
Payer: COMMERCIAL

## 2020-10-14 DIAGNOSIS — R51.9 INTRACTABLE HEADACHE: Primary | ICD-10-CM

## 2020-10-14 DIAGNOSIS — G43.709 CHRONIC MIGRAINE WITHOUT AURA WITHOUT STATUS MIGRAINOSUS, NOT INTRACTABLE: Primary | ICD-10-CM

## 2020-10-14 DIAGNOSIS — F10.10 ALCOHOL ABUSE: ICD-10-CM

## 2020-10-14 LAB — ETHANOL SERPL-MCNC: <3 MG/DL (ref 0–3)

## 2020-10-14 PROCEDURE — 99223 1ST HOSP IP/OBS HIGH 75: CPT | Performed by: INTERNAL MEDICINE

## 2020-10-14 PROCEDURE — 80320 DRUG SCREEN QUANTALCOHOLS: CPT | Performed by: INTERNAL MEDICINE

## 2020-10-14 RX ORDER — HYDROCODONE BITARTRATE AND ACETAMINOPHEN 5; 325 MG/1; MG/1
2 TABLET ORAL EVERY 6 HOURS PRN
Status: DISCONTINUED | OUTPATIENT
Start: 2020-10-14 | End: 2020-10-20 | Stop reason: HOSPADM

## 2020-10-14 RX ORDER — FOLIC ACID 1 MG/1
1 TABLET ORAL DAILY
Status: DISCONTINUED | OUTPATIENT
Start: 2020-10-15 | End: 2020-10-20 | Stop reason: HOSPADM

## 2020-10-14 RX ORDER — FLUTICASONE PROPIONATE 50 MCG
1 SPRAY, SUSPENSION (ML) NASAL DAILY
Status: DISCONTINUED | OUTPATIENT
Start: 2020-10-15 | End: 2020-10-20 | Stop reason: HOSPADM

## 2020-10-14 RX ORDER — ASCORBIC ACID 500 MG
250 TABLET ORAL 2 TIMES DAILY
Status: DISCONTINUED | OUTPATIENT
Start: 2020-10-14 | End: 2020-10-20 | Stop reason: HOSPADM

## 2020-10-14 RX ORDER — THIAMINE MONONITRATE (VIT B1) 100 MG
100 TABLET ORAL DAILY
Status: DISCONTINUED | OUTPATIENT
Start: 2020-10-15 | End: 2020-10-20 | Stop reason: HOSPADM

## 2020-10-14 RX ORDER — LORATADINE 10 MG/1
10 TABLET ORAL DAILY
Status: DISCONTINUED | OUTPATIENT
Start: 2020-10-15 | End: 2020-10-20 | Stop reason: HOSPADM

## 2020-10-14 RX ORDER — MELATONIN
1000 2 TIMES DAILY
Status: DISCONTINUED | OUTPATIENT
Start: 2020-10-14 | End: 2020-10-20 | Stop reason: HOSPADM

## 2020-10-14 RX ORDER — ONDANSETRON 2 MG/ML
4 INJECTION INTRAMUSCULAR; INTRAVENOUS EVERY 6 HOURS PRN
Status: DISCONTINUED | OUTPATIENT
Start: 2020-10-14 | End: 2020-10-15 | Stop reason: SDUPTHER

## 2020-10-14 RX ORDER — LANOLIN ALCOHOL/MO/W.PET/CERES
3 CREAM (GRAM) TOPICAL
Status: DISCONTINUED | OUTPATIENT
Start: 2020-10-14 | End: 2020-10-20 | Stop reason: HOSPADM

## 2020-10-14 RX ORDER — DIPHENHYDRAMINE HCL 25 MG
25 TABLET ORAL
Status: DISCONTINUED | OUTPATIENT
Start: 2020-10-14 | End: 2020-10-20 | Stop reason: HOSPADM

## 2020-10-14 RX ADMIN — DIPHENHYDRAMINE HCL 25 MG: 25 TABLET ORAL at 21:16

## 2020-10-14 RX ADMIN — MELATONIN 3 MG: at 21:16

## 2020-10-14 RX ADMIN — HYDROCODONE BITARTRATE AND ACETAMINOPHEN 2 TABLET: 5; 325 TABLET ORAL at 21:14

## 2020-10-14 RX ADMIN — OXYCODONE HYDROCHLORIDE AND ACETAMINOPHEN 250 MG: 500 TABLET ORAL at 21:16

## 2020-10-14 RX ADMIN — Medication 1000 UNITS: at 21:16

## 2020-10-15 ENCOUNTER — TRANSCRIBE ORDERS (OUTPATIENT)
Dept: NEUROLOGY | Facility: CLINIC | Age: 65
End: 2020-10-15

## 2020-10-15 ENCOUNTER — TELEPHONE (OUTPATIENT)
Dept: NEUROLOGY | Facility: CLINIC | Age: 65
End: 2020-10-15

## 2020-10-15 DIAGNOSIS — M54.2 CERVICALGIA: ICD-10-CM

## 2020-10-15 DIAGNOSIS — G43.709 CHRONIC MIGRAINE WITHOUT AURA, NOT INTRACTABLE, WITHOUT STATUS MIGRAINOSUS: Primary | ICD-10-CM

## 2020-10-15 LAB
ALBUMIN SERPL BCP-MCNC: 4 G/DL (ref 3.5–5)
ALP SERPL-CCNC: 46 U/L (ref 46–116)
ALT SERPL W P-5'-P-CCNC: 56 U/L (ref 12–78)
ANION GAP SERPL CALCULATED.3IONS-SCNC: 4 MMOL/L (ref 4–13)
AST SERPL W P-5'-P-CCNC: 17 U/L (ref 5–45)
ATRIAL RATE: 77 BPM
BASOPHILS # BLD AUTO: 0.04 THOUSANDS/ΜL (ref 0–0.1)
BASOPHILS NFR BLD AUTO: 1 % (ref 0–1)
BILIRUB SERPL-MCNC: 0.64 MG/DL (ref 0.2–1)
BUN SERPL-MCNC: 19 MG/DL (ref 5–25)
CALCIUM SERPL-MCNC: 8.9 MG/DL (ref 8.3–10.1)
CHLORIDE SERPL-SCNC: 108 MMOL/L (ref 100–108)
CO2 SERPL-SCNC: 29 MMOL/L (ref 21–32)
CREAT SERPL-MCNC: 0.86 MG/DL (ref 0.6–1.3)
EOSINOPHIL # BLD AUTO: 0.33 THOUSAND/ΜL (ref 0–0.61)
EOSINOPHIL NFR BLD AUTO: 6 % (ref 0–6)
ERYTHROCYTE [DISTWIDTH] IN BLOOD BY AUTOMATED COUNT: 11.9 % (ref 11.6–15.1)
GFR SERPL CREATININE-BSD FRML MDRD: 92 ML/MIN/1.73SQ M
GLUCOSE SERPL-MCNC: 92 MG/DL (ref 65–140)
HCT VFR BLD AUTO: 45.7 % (ref 36.5–49.3)
HGB BLD-MCNC: 15 G/DL (ref 12–17)
IMM GRANULOCYTES # BLD AUTO: 0.01 THOUSAND/UL (ref 0–0.2)
IMM GRANULOCYTES NFR BLD AUTO: 0 % (ref 0–2)
LYMPHOCYTES # BLD AUTO: 2.45 THOUSANDS/ΜL (ref 0.6–4.47)
LYMPHOCYTES NFR BLD AUTO: 41 % (ref 14–44)
MAGNESIUM SERPL-MCNC: 2.3 MG/DL (ref 1.6–2.6)
MCH RBC QN AUTO: 34.3 PG (ref 26.8–34.3)
MCHC RBC AUTO-ENTMCNC: 32.8 G/DL (ref 31.4–37.4)
MCV RBC AUTO: 105 FL (ref 82–98)
MONOCYTES # BLD AUTO: 0.72 THOUSAND/ΜL (ref 0.17–1.22)
MONOCYTES NFR BLD AUTO: 12 % (ref 4–12)
NEUTROPHILS # BLD AUTO: 2.46 THOUSANDS/ΜL (ref 1.85–7.62)
NEUTS SEG NFR BLD AUTO: 40 % (ref 43–75)
NRBC BLD AUTO-RTO: 0 /100 WBCS
P AXIS: 52 DEGREES
PHOSPHATE SERPL-MCNC: 3.2 MG/DL (ref 2.3–4.1)
PLATELET # BLD AUTO: 217 THOUSANDS/UL (ref 149–390)
PMV BLD AUTO: 10.1 FL (ref 8.9–12.7)
POTASSIUM SERPL-SCNC: 4.4 MMOL/L (ref 3.5–5.3)
PR INTERVAL: 168 MS
PROT SERPL-MCNC: 7.1 G/DL (ref 6.4–8.2)
QRS AXIS: 1 DEGREES
QRSD INTERVAL: 90 MS
QT INTERVAL: 374 MS
QTC INTERVAL: 423 MS
RBC # BLD AUTO: 4.37 MILLION/UL (ref 3.88–5.62)
SODIUM SERPL-SCNC: 141 MMOL/L (ref 136–145)
T WAVE AXIS: 45 DEGREES
VENTRICULAR RATE: 77 BPM
WBC # BLD AUTO: 6.01 THOUSAND/UL (ref 4.31–10.16)

## 2020-10-15 PROCEDURE — 99255 IP/OBS CONSLTJ NEW/EST HI 80: CPT | Performed by: PSYCHIATRY & NEUROLOGY

## 2020-10-15 PROCEDURE — 99233 SBSQ HOSP IP/OBS HIGH 50: CPT | Performed by: INTERNAL MEDICINE

## 2020-10-15 PROCEDURE — 93010 ELECTROCARDIOGRAM REPORT: CPT | Performed by: INTERNAL MEDICINE

## 2020-10-15 PROCEDURE — 84100 ASSAY OF PHOSPHORUS: CPT | Performed by: INTERNAL MEDICINE

## 2020-10-15 PROCEDURE — 80053 COMPREHEN METABOLIC PANEL: CPT | Performed by: INTERNAL MEDICINE

## 2020-10-15 PROCEDURE — 85025 COMPLETE CBC W/AUTO DIFF WBC: CPT | Performed by: INTERNAL MEDICINE

## 2020-10-15 PROCEDURE — 83735 ASSAY OF MAGNESIUM: CPT | Performed by: INTERNAL MEDICINE

## 2020-10-15 PROCEDURE — 93005 ELECTROCARDIOGRAM TRACING: CPT

## 2020-10-15 PROCEDURE — 84425 ASSAY OF VITAMIN B-1: CPT | Performed by: INTERNAL MEDICINE

## 2020-10-15 RX ORDER — PROCHLORPERAZINE MALEATE 5 MG/1
5 TABLET ORAL EVERY 6 HOURS PRN
Status: DISCONTINUED | OUTPATIENT
Start: 2020-10-15 | End: 2020-10-15

## 2020-10-15 RX ORDER — ONDANSETRON 2 MG/ML
4 INJECTION INTRAMUSCULAR; INTRAVENOUS EVERY 6 HOURS PRN
Status: DISCONTINUED | OUTPATIENT
Start: 2020-10-15 | End: 2020-10-20 | Stop reason: HOSPADM

## 2020-10-15 RX ORDER — ACETAMINOPHEN 325 MG/1
650 TABLET ORAL EVERY 6 HOURS PRN
Status: DISCONTINUED | OUTPATIENT
Start: 2020-10-15 | End: 2020-10-20 | Stop reason: HOSPADM

## 2020-10-15 RX ORDER — PROCHLORPERAZINE MALEATE 10 MG
10 TABLET ORAL 3 TIMES DAILY
Status: DISCONTINUED | OUTPATIENT
Start: 2020-10-15 | End: 2020-10-20 | Stop reason: HOSPADM

## 2020-10-15 RX ORDER — KETOROLAC TROMETHAMINE 30 MG/ML
30 INJECTION, SOLUTION INTRAMUSCULAR; INTRAVENOUS EVERY 12 HOURS SCHEDULED
Status: DISCONTINUED | OUTPATIENT
Start: 2020-10-15 | End: 2020-10-15

## 2020-10-15 RX ORDER — DIPHENHYDRAMINE HYDROCHLORIDE 50 MG/ML
25 INJECTION INTRAMUSCULAR; INTRAVENOUS EVERY 8 HOURS PRN
Status: ACTIVE | OUTPATIENT
Start: 2020-10-15 | End: 2020-10-18

## 2020-10-15 RX ORDER — HEPARIN SODIUM 5000 [USP'U]/ML
5000 INJECTION, SOLUTION INTRAVENOUS; SUBCUTANEOUS EVERY 8 HOURS SCHEDULED
Status: DISCONTINUED | OUTPATIENT
Start: 2020-10-15 | End: 2020-10-20 | Stop reason: HOSPADM

## 2020-10-15 RX ORDER — DIPHENHYDRAMINE HYDROCHLORIDE 50 MG/ML
25 INJECTION INTRAMUSCULAR; INTRAVENOUS EVERY 8 HOURS PRN
Status: DISCONTINUED | OUTPATIENT
Start: 2020-10-15 | End: 2020-10-15 | Stop reason: SDUPTHER

## 2020-10-15 RX ORDER — CYCLOBENZAPRINE HCL 10 MG
10 TABLET ORAL EVERY MORNING
Status: COMPLETED | OUTPATIENT
Start: 2020-10-15 | End: 2020-10-17

## 2020-10-15 RX ORDER — METOCLOPRAMIDE HYDROCHLORIDE 5 MG/ML
10 INJECTION INTRAMUSCULAR; INTRAVENOUS EVERY 8 HOURS SCHEDULED
Status: DISCONTINUED | OUTPATIENT
Start: 2020-10-15 | End: 2020-10-15

## 2020-10-15 RX ORDER — SODIUM CHLORIDE 9 MG/ML
75 INJECTION, SOLUTION INTRAVENOUS CONTINUOUS
Status: DISCONTINUED | OUTPATIENT
Start: 2020-10-15 | End: 2020-10-20 | Stop reason: HOSPADM

## 2020-10-15 RX ORDER — METOCLOPRAMIDE HYDROCHLORIDE 5 MG/ML
10 INJECTION INTRAMUSCULAR; INTRAVENOUS EVERY 8 HOURS SCHEDULED
Status: DISCONTINUED | OUTPATIENT
Start: 2020-10-15 | End: 2020-10-15 | Stop reason: SDUPTHER

## 2020-10-15 RX ORDER — DOCUSATE SODIUM 100 MG/1
100 CAPSULE, LIQUID FILLED ORAL 2 TIMES DAILY PRN
Status: DISCONTINUED | OUTPATIENT
Start: 2020-10-15 | End: 2020-10-20 | Stop reason: HOSPADM

## 2020-10-15 RX ORDER — MAGNESIUM SULFATE HEPTAHYDRATE 40 MG/ML
2 INJECTION, SOLUTION INTRAVENOUS
Status: COMPLETED | OUTPATIENT
Start: 2020-10-15 | End: 2020-10-17

## 2020-10-15 RX ADMIN — FLUTICASONE PROPIONATE 1 SPRAY: 50 SPRAY, METERED NASAL at 09:24

## 2020-10-15 RX ADMIN — LORATADINE 10 MG: 10 TABLET ORAL at 09:22

## 2020-10-15 RX ADMIN — VALPROATE SODIUM 1000 MG: 100 INJECTION, SOLUTION INTRAVENOUS at 12:09

## 2020-10-15 RX ADMIN — HYDROCODONE BITARTRATE AND ACETAMINOPHEN 2 TABLET: 5; 325 TABLET ORAL at 23:28

## 2020-10-15 RX ADMIN — THIAMINE HCL TAB 100 MG 100 MG: 100 TAB at 09:22

## 2020-10-15 RX ADMIN — OXYCODONE HYDROCHLORIDE AND ACETAMINOPHEN 250 MG: 500 TABLET ORAL at 17:15

## 2020-10-15 RX ADMIN — PROCHLORPERAZINE MALEATE 10 MG: 10 TABLET ORAL at 17:15

## 2020-10-15 RX ADMIN — SODIUM CHLORIDE 500 MG: 0.9 INJECTION, SOLUTION INTRAVENOUS at 14:39

## 2020-10-15 RX ADMIN — OXYCODONE HYDROCHLORIDE AND ACETAMINOPHEN 250 MG: 500 TABLET ORAL at 09:23

## 2020-10-15 RX ADMIN — CYCLOBENZAPRINE HYDROCHLORIDE 10 MG: 10 TABLET, FILM COATED ORAL at 12:05

## 2020-10-15 RX ADMIN — FOLIC ACID 1 MG: 1 TABLET ORAL at 09:23

## 2020-10-15 RX ADMIN — HEPARIN SODIUM 5000 UNITS: 5000 INJECTION INTRAVENOUS; SUBCUTANEOUS at 14:39

## 2020-10-15 RX ADMIN — HYDROCODONE BITARTRATE AND ACETAMINOPHEN 2 TABLET: 5; 325 TABLET ORAL at 05:17

## 2020-10-15 RX ADMIN — Medication 1000 UNITS: at 17:16

## 2020-10-15 RX ADMIN — B-COMPLEX W/ C & FOLIC ACID TAB 1 TABLET: TAB at 12:05

## 2020-10-15 RX ADMIN — HEPARIN SODIUM 5000 UNITS: 5000 INJECTION INTRAVENOUS; SUBCUTANEOUS at 21:42

## 2020-10-15 RX ADMIN — HYDROCODONE BITARTRATE AND ACETAMINOPHEN 2 TABLET: 5; 325 TABLET ORAL at 17:16

## 2020-10-15 RX ADMIN — Medication 1000 UNITS: at 09:22

## 2020-10-15 RX ADMIN — MELATONIN 3 MG: at 21:39

## 2020-10-15 RX ADMIN — SODIUM CHLORIDE 75 ML/HR: 0.9 INJECTION, SOLUTION INTRAVENOUS at 12:08

## 2020-10-15 RX ADMIN — MAGNESIUM SULFATE IN WATER 2 G: 40 INJECTION, SOLUTION INTRAVENOUS at 12:08

## 2020-10-15 RX ADMIN — DIPHENHYDRAMINE HCL 25 MG: 25 TABLET ORAL at 21:39

## 2020-10-16 LAB
ANION GAP SERPL CALCULATED.3IONS-SCNC: 7 MMOL/L (ref 4–13)
ATRIAL RATE: 75 BPM
ATRIAL RATE: 76 BPM
BASOPHILS # BLD AUTO: 0.01 THOUSANDS/ΜL (ref 0–0.1)
BASOPHILS NFR BLD AUTO: 0 % (ref 0–1)
BUN SERPL-MCNC: 14 MG/DL (ref 5–25)
CALCIUM SERPL-MCNC: 9.1 MG/DL (ref 8.3–10.1)
CHLORIDE SERPL-SCNC: 106 MMOL/L (ref 100–108)
CO2 SERPL-SCNC: 26 MMOL/L (ref 21–32)
CREAT SERPL-MCNC: 1 MG/DL (ref 0.6–1.3)
EOSINOPHIL # BLD AUTO: 0 THOUSAND/ΜL (ref 0–0.61)
EOSINOPHIL NFR BLD AUTO: 0 % (ref 0–6)
ERYTHROCYTE [DISTWIDTH] IN BLOOD BY AUTOMATED COUNT: 11.6 % (ref 11.6–15.1)
GFR SERPL CREATININE-BSD FRML MDRD: 80 ML/MIN/1.73SQ M
GLUCOSE SERPL-MCNC: 150 MG/DL (ref 65–140)
HCT VFR BLD AUTO: 47.3 % (ref 36.5–49.3)
HGB BLD-MCNC: 15.8 G/DL (ref 12–17)
IMM GRANULOCYTES # BLD AUTO: 0.05 THOUSAND/UL (ref 0–0.2)
IMM GRANULOCYTES NFR BLD AUTO: 1 % (ref 0–2)
LYMPHOCYTES # BLD AUTO: 0.82 THOUSANDS/ΜL (ref 0.6–4.47)
LYMPHOCYTES NFR BLD AUTO: 9 % (ref 14–44)
MCH RBC QN AUTO: 34.8 PG (ref 26.8–34.3)
MCHC RBC AUTO-ENTMCNC: 33.4 G/DL (ref 31.4–37.4)
MCV RBC AUTO: 104 FL (ref 82–98)
MONOCYTES # BLD AUTO: 0.17 THOUSAND/ΜL (ref 0.17–1.22)
MONOCYTES NFR BLD AUTO: 2 % (ref 4–12)
NEUTROPHILS # BLD AUTO: 7.86 THOUSANDS/ΜL (ref 1.85–7.62)
NEUTS SEG NFR BLD AUTO: 88 % (ref 43–75)
NRBC BLD AUTO-RTO: 0 /100 WBCS
P AXIS: 53 DEGREES
P AXIS: 57 DEGREES
PLATELET # BLD AUTO: 244 THOUSANDS/UL (ref 149–390)
PMV BLD AUTO: 9.8 FL (ref 8.9–12.7)
POTASSIUM SERPL-SCNC: 3.7 MMOL/L (ref 3.5–5.3)
PR INTERVAL: 160 MS
PR INTERVAL: 166 MS
QRS AXIS: 15 DEGREES
QRS AXIS: 16 DEGREES
QRSD INTERVAL: 90 MS
QRSD INTERVAL: 90 MS
QT INTERVAL: 394 MS
QT INTERVAL: 400 MS
QTC INTERVAL: 439 MS
QTC INTERVAL: 450 MS
RBC # BLD AUTO: 4.54 MILLION/UL (ref 3.88–5.62)
SODIUM SERPL-SCNC: 139 MMOL/L (ref 136–145)
T WAVE AXIS: 39 DEGREES
T WAVE AXIS: 42 DEGREES
VENTRICULAR RATE: 75 BPM
VENTRICULAR RATE: 76 BPM
WBC # BLD AUTO: 8.91 THOUSAND/UL (ref 4.31–10.16)

## 2020-10-16 PROCEDURE — 99232 SBSQ HOSP IP/OBS MODERATE 35: CPT | Performed by: PHYSICIAN ASSISTANT

## 2020-10-16 PROCEDURE — 99232 SBSQ HOSP IP/OBS MODERATE 35: CPT | Performed by: PSYCHIATRY & NEUROLOGY

## 2020-10-16 PROCEDURE — 93010 ELECTROCARDIOGRAM REPORT: CPT | Performed by: INTERNAL MEDICINE

## 2020-10-16 PROCEDURE — 80048 BASIC METABOLIC PNL TOTAL CA: CPT | Performed by: INTERNAL MEDICINE

## 2020-10-16 PROCEDURE — 93005 ELECTROCARDIOGRAM TRACING: CPT

## 2020-10-16 PROCEDURE — 84425 ASSAY OF VITAMIN B-1: CPT | Performed by: PSYCHIATRY & NEUROLOGY

## 2020-10-16 PROCEDURE — 85025 COMPLETE CBC W/AUTO DIFF WBC: CPT | Performed by: INTERNAL MEDICINE

## 2020-10-16 PROCEDURE — 97161 PT EVAL LOW COMPLEX 20 MIN: CPT

## 2020-10-16 RX ORDER — AMITRIPTYLINE HYDROCHLORIDE 10 MG/1
10 TABLET, FILM COATED ORAL
Status: DISCONTINUED | OUTPATIENT
Start: 2020-10-16 | End: 2020-10-20

## 2020-10-16 RX ADMIN — HYDROCODONE BITARTRATE AND ACETAMINOPHEN 2 TABLET: 5; 325 TABLET ORAL at 12:10

## 2020-10-16 RX ADMIN — PROCHLORPERAZINE MALEATE 10 MG: 10 TABLET ORAL at 22:18

## 2020-10-16 RX ADMIN — SODIUM CHLORIDE 75 ML/HR: 0.9 INJECTION, SOLUTION INTRAVENOUS at 19:25

## 2020-10-16 RX ADMIN — PROCHLORPERAZINE MALEATE 10 MG: 10 TABLET ORAL at 09:31

## 2020-10-16 RX ADMIN — HYDROCODONE BITARTRATE AND ACETAMINOPHEN 2 TABLET: 5; 325 TABLET ORAL at 18:32

## 2020-10-16 RX ADMIN — HYDROCODONE BITARTRATE AND ACETAMINOPHEN 2 TABLET: 5; 325 TABLET ORAL at 06:06

## 2020-10-16 RX ADMIN — DIPHENHYDRAMINE HCL 25 MG: 25 TABLET ORAL at 22:18

## 2020-10-16 RX ADMIN — CYCLOBENZAPRINE HYDROCHLORIDE 10 MG: 10 TABLET, FILM COATED ORAL at 09:31

## 2020-10-16 RX ADMIN — VALPROATE SODIUM 500 MG: 100 INJECTION, SOLUTION INTRAVENOUS at 09:34

## 2020-10-16 RX ADMIN — FOLIC ACID 1 MG: 1 TABLET ORAL at 09:31

## 2020-10-16 RX ADMIN — Medication 1000 UNITS: at 09:31

## 2020-10-16 RX ADMIN — MAGNESIUM SULFATE IN WATER 2 G: 40 INJECTION, SOLUTION INTRAVENOUS at 09:40

## 2020-10-16 RX ADMIN — LORATADINE 10 MG: 10 TABLET ORAL at 09:31

## 2020-10-16 RX ADMIN — HEPARIN SODIUM 5000 UNITS: 5000 INJECTION INTRAVENOUS; SUBCUTANEOUS at 13:09

## 2020-10-16 RX ADMIN — OXYCODONE HYDROCHLORIDE AND ACETAMINOPHEN 250 MG: 500 TABLET ORAL at 15:57

## 2020-10-16 RX ADMIN — OXYCODONE HYDROCHLORIDE AND ACETAMINOPHEN 250 MG: 500 TABLET ORAL at 09:30

## 2020-10-16 RX ADMIN — FLUTICASONE PROPIONATE 1 SPRAY: 50 SPRAY, METERED NASAL at 09:32

## 2020-10-16 RX ADMIN — SODIUM CHLORIDE 500 MG: 0.9 INJECTION, SOLUTION INTRAVENOUS at 10:26

## 2020-10-16 RX ADMIN — VALPROATE SODIUM 500 MG: 100 INJECTION, SOLUTION INTRAVENOUS at 22:18

## 2020-10-16 RX ADMIN — MELATONIN 3 MG: at 22:18

## 2020-10-16 RX ADMIN — THIAMINE HCL TAB 100 MG 100 MG: 100 TAB at 09:31

## 2020-10-16 RX ADMIN — AMITRIPTYLINE HYDROCHLORIDE 10 MG: 10 TABLET, FILM COATED ORAL at 22:18

## 2020-10-16 RX ADMIN — HEPARIN SODIUM 5000 UNITS: 5000 INJECTION INTRAVENOUS; SUBCUTANEOUS at 22:18

## 2020-10-16 RX ADMIN — HEPARIN SODIUM 5000 UNITS: 5000 INJECTION INTRAVENOUS; SUBCUTANEOUS at 06:07

## 2020-10-16 RX ADMIN — PROCHLORPERAZINE MALEATE 10 MG: 10 TABLET ORAL at 15:56

## 2020-10-16 RX ADMIN — B-COMPLEX W/ C & FOLIC ACID TAB 1 TABLET: TAB at 09:33

## 2020-10-16 RX ADMIN — Medication 1000 UNITS: at 15:57

## 2020-10-17 LAB
ATRIAL RATE: 80 BPM
P AXIS: 56 DEGREES
PR INTERVAL: 164 MS
QRS AXIS: 33 DEGREES
QRSD INTERVAL: 92 MS
QT INTERVAL: 386 MS
QTC INTERVAL: 445 MS
T WAVE AXIS: 42 DEGREES
VENTRICULAR RATE: 80 BPM

## 2020-10-17 PROCEDURE — 99232 SBSQ HOSP IP/OBS MODERATE 35: CPT | Performed by: PHYSICIAN ASSISTANT

## 2020-10-17 PROCEDURE — 99232 SBSQ HOSP IP/OBS MODERATE 35: CPT | Performed by: PSYCHIATRY & NEUROLOGY

## 2020-10-17 PROCEDURE — 93010 ELECTROCARDIOGRAM REPORT: CPT | Performed by: INTERNAL MEDICINE

## 2020-10-17 PROCEDURE — 93005 ELECTROCARDIOGRAM TRACING: CPT

## 2020-10-17 PROCEDURE — 94760 N-INVAS EAR/PLS OXIMETRY 1: CPT

## 2020-10-17 RX ORDER — MAGNESIUM SULFATE HEPTAHYDRATE 40 MG/ML
2 INJECTION, SOLUTION INTRAVENOUS
Status: COMPLETED | OUTPATIENT
Start: 2020-10-18 | End: 2020-10-20

## 2020-10-17 RX ADMIN — HEPARIN SODIUM 5000 UNITS: 5000 INJECTION INTRAVENOUS; SUBCUTANEOUS at 21:56

## 2020-10-17 RX ADMIN — HYDROCODONE BITARTRATE AND ACETAMINOPHEN 2 TABLET: 5; 325 TABLET ORAL at 09:18

## 2020-10-17 RX ADMIN — VALPROATE SODIUM 500 MG: 100 INJECTION, SOLUTION INTRAVENOUS at 09:39

## 2020-10-17 RX ADMIN — Medication 1000 UNITS: at 09:19

## 2020-10-17 RX ADMIN — B-COMPLEX W/ C & FOLIC ACID TAB 1 TABLET: TAB at 09:22

## 2020-10-17 RX ADMIN — CYCLOBENZAPRINE HYDROCHLORIDE 10 MG: 10 TABLET, FILM COATED ORAL at 09:18

## 2020-10-17 RX ADMIN — THIAMINE HCL TAB 100 MG 100 MG: 100 TAB at 09:19

## 2020-10-17 RX ADMIN — PROCHLORPERAZINE MALEATE 10 MG: 10 TABLET ORAL at 21:56

## 2020-10-17 RX ADMIN — OXYCODONE HYDROCHLORIDE AND ACETAMINOPHEN 250 MG: 500 TABLET ORAL at 09:21

## 2020-10-17 RX ADMIN — MAGNESIUM SULFATE IN WATER 2 G: 40 INJECTION, SOLUTION INTRAVENOUS at 11:59

## 2020-10-17 RX ADMIN — MELATONIN 3 MG: at 21:57

## 2020-10-17 RX ADMIN — SODIUM CHLORIDE 500 MG: 0.9 INJECTION, SOLUTION INTRAVENOUS at 10:20

## 2020-10-17 RX ADMIN — HEPARIN SODIUM 5000 UNITS: 5000 INJECTION INTRAVENOUS; SUBCUTANEOUS at 14:48

## 2020-10-17 RX ADMIN — HYDROCODONE BITARTRATE AND ACETAMINOPHEN 2 TABLET: 5; 325 TABLET ORAL at 17:10

## 2020-10-17 RX ADMIN — AMITRIPTYLINE HYDROCHLORIDE 10 MG: 10 TABLET, FILM COATED ORAL at 21:57

## 2020-10-17 RX ADMIN — VALPROATE SODIUM 500 MG: 100 INJECTION, SOLUTION INTRAVENOUS at 22:25

## 2020-10-17 RX ADMIN — PROCHLORPERAZINE MALEATE 10 MG: 10 TABLET ORAL at 09:18

## 2020-10-17 RX ADMIN — OXYCODONE HYDROCHLORIDE AND ACETAMINOPHEN 250 MG: 500 TABLET ORAL at 17:09

## 2020-10-17 RX ADMIN — HYDROCODONE BITARTRATE AND ACETAMINOPHEN 2 TABLET: 5; 325 TABLET ORAL at 00:48

## 2020-10-17 RX ADMIN — Medication 1000 UNITS: at 17:09

## 2020-10-17 RX ADMIN — FLUTICASONE PROPIONATE 1 SPRAY: 50 SPRAY, METERED NASAL at 09:18

## 2020-10-17 RX ADMIN — LORATADINE 10 MG: 10 TABLET ORAL at 09:19

## 2020-10-17 RX ADMIN — FOLIC ACID 1 MG: 1 TABLET ORAL at 09:18

## 2020-10-17 RX ADMIN — HEPARIN SODIUM 5000 UNITS: 5000 INJECTION INTRAVENOUS; SUBCUTANEOUS at 05:48

## 2020-10-17 RX ADMIN — PROCHLORPERAZINE MALEATE 10 MG: 10 TABLET ORAL at 17:09

## 2020-10-17 RX ADMIN — DIPHENHYDRAMINE HCL 25 MG: 25 TABLET ORAL at 21:56

## 2020-10-18 LAB
ATRIAL RATE: 64 BPM
P AXIS: 58 DEGREES
PR INTERVAL: 168 MS
QRS AXIS: 17 DEGREES
QRSD INTERVAL: 88 MS
QT INTERVAL: 406 MS
QTC INTERVAL: 418 MS
T WAVE AXIS: 45 DEGREES
VENTRICULAR RATE: 64 BPM

## 2020-10-18 PROCEDURE — 94760 N-INVAS EAR/PLS OXIMETRY 1: CPT

## 2020-10-18 PROCEDURE — 93010 ELECTROCARDIOGRAM REPORT: CPT | Performed by: INTERNAL MEDICINE

## 2020-10-18 PROCEDURE — 99232 SBSQ HOSP IP/OBS MODERATE 35: CPT | Performed by: PHYSICIAN ASSISTANT

## 2020-10-18 PROCEDURE — 93005 ELECTROCARDIOGRAM TRACING: CPT

## 2020-10-18 PROCEDURE — 99232 SBSQ HOSP IP/OBS MODERATE 35: CPT | Performed by: PSYCHIATRY & NEUROLOGY

## 2020-10-18 RX ORDER — DIHYDROERGOTAMINE MESYLATE 1 MG/ML
1 INJECTION, SOLUTION INTRAMUSCULAR; INTRAVENOUS; SUBCUTANEOUS EVERY 8 HOURS SCHEDULED
Status: COMPLETED | OUTPATIENT
Start: 2020-10-18 | End: 2020-10-20

## 2020-10-18 RX ORDER — DIHYDROERGOTAMINE MESYLATE 1 MG/ML
0.5 INJECTION, SOLUTION INTRAMUSCULAR; INTRAVENOUS; SUBCUTANEOUS ONCE
Status: COMPLETED | OUTPATIENT
Start: 2020-10-18 | End: 2020-10-18

## 2020-10-18 RX ADMIN — THIAMINE HCL TAB 100 MG 100 MG: 100 TAB at 08:45

## 2020-10-18 RX ADMIN — Medication 1000 UNITS: at 17:19

## 2020-10-18 RX ADMIN — HYDROCODONE BITARTRATE AND ACETAMINOPHEN 2 TABLET: 5; 325 TABLET ORAL at 01:00

## 2020-10-18 RX ADMIN — HYDROCODONE BITARTRATE AND ACETAMINOPHEN 2 TABLET: 5; 325 TABLET ORAL at 23:21

## 2020-10-18 RX ADMIN — PROCHLORPERAZINE MALEATE 10 MG: 10 TABLET ORAL at 17:19

## 2020-10-18 RX ADMIN — DIHYDROERGOTAMINE MESYLATE 0.5 MG: 1 INJECTION, SOLUTION INTRAMUSCULAR; INTRAVENOUS; SUBCUTANEOUS at 14:36

## 2020-10-18 RX ADMIN — MAGNESIUM SULFATE IN WATER 2 G: 40 INJECTION, SOLUTION INTRAVENOUS at 09:35

## 2020-10-18 RX ADMIN — HYDROCODONE BITARTRATE AND ACETAMINOPHEN 2 TABLET: 5; 325 TABLET ORAL at 08:47

## 2020-10-18 RX ADMIN — FLUTICASONE PROPIONATE 1 SPRAY: 50 SPRAY, METERED NASAL at 08:46

## 2020-10-18 RX ADMIN — PROCHLORPERAZINE MALEATE 10 MG: 10 TABLET ORAL at 08:44

## 2020-10-18 RX ADMIN — Medication 1000 UNITS: at 08:45

## 2020-10-18 RX ADMIN — OXYCODONE HYDROCHLORIDE AND ACETAMINOPHEN 250 MG: 500 TABLET ORAL at 08:45

## 2020-10-18 RX ADMIN — FOLIC ACID 1 MG: 1 TABLET ORAL at 08:44

## 2020-10-18 RX ADMIN — PROCHLORPERAZINE MALEATE 10 MG: 10 TABLET ORAL at 23:15

## 2020-10-18 RX ADMIN — LORATADINE 10 MG: 10 TABLET ORAL at 08:44

## 2020-10-18 RX ADMIN — AMITRIPTYLINE HYDROCHLORIDE 10 MG: 10 TABLET, FILM COATED ORAL at 23:14

## 2020-10-18 RX ADMIN — HYDROCODONE BITARTRATE AND ACETAMINOPHEN 2 TABLET: 5; 325 TABLET ORAL at 17:19

## 2020-10-18 RX ADMIN — B-COMPLEX W/ C & FOLIC ACID TAB 1 TABLET: TAB at 08:45

## 2020-10-18 RX ADMIN — OXYCODONE HYDROCHLORIDE AND ACETAMINOPHEN 250 MG: 500 TABLET ORAL at 17:19

## 2020-10-18 RX ADMIN — HEPARIN SODIUM 5000 UNITS: 5000 INJECTION INTRAVENOUS; SUBCUTANEOUS at 14:36

## 2020-10-18 RX ADMIN — VALPROATE SODIUM 500 MG: 100 INJECTION, SOLUTION INTRAVENOUS at 08:48

## 2020-10-18 RX ADMIN — DIHYDROERGOTAMINE MESYLATE 1 MG: 1 INJECTION, SOLUTION INTRAMUSCULAR; INTRAVENOUS; SUBCUTANEOUS at 23:15

## 2020-10-18 RX ADMIN — HEPARIN SODIUM 5000 UNITS: 5000 INJECTION INTRAVENOUS; SUBCUTANEOUS at 05:54

## 2020-10-18 RX ADMIN — DIPHENHYDRAMINE HCL 25 MG: 25 TABLET ORAL at 23:14

## 2020-10-18 RX ADMIN — SODIUM CHLORIDE 75 ML/HR: 0.9 INJECTION, SOLUTION INTRAVENOUS at 19:42

## 2020-10-18 RX ADMIN — MELATONIN 3 MG: at 23:14

## 2020-10-18 RX ADMIN — VALPROATE SODIUM 500 MG: 100 INJECTION, SOLUTION INTRAVENOUS at 23:14

## 2020-10-18 RX ADMIN — HEPARIN SODIUM 5000 UNITS: 5000 INJECTION INTRAVENOUS; SUBCUTANEOUS at 23:14

## 2020-10-19 LAB — VIT B1 BLD-SCNC: 200.8 NMOL/L (ref 66.5–200)

## 2020-10-19 PROCEDURE — 99232 SBSQ HOSP IP/OBS MODERATE 35: CPT | Performed by: PSYCHIATRY & NEUROLOGY

## 2020-10-19 PROCEDURE — 94760 N-INVAS EAR/PLS OXIMETRY 1: CPT

## 2020-10-19 PROCEDURE — 99232 SBSQ HOSP IP/OBS MODERATE 35: CPT | Performed by: PHYSICIAN ASSISTANT

## 2020-10-19 RX ORDER — DIVALPROEX SODIUM 500 MG/1
500 TABLET, DELAYED RELEASE ORAL EVERY 12 HOURS SCHEDULED
Status: DISCONTINUED | OUTPATIENT
Start: 2020-10-19 | End: 2020-10-20 | Stop reason: HOSPADM

## 2020-10-19 RX ORDER — ALBUTEROL SULFATE 2.5 MG/3ML
2.5 SOLUTION RESPIRATORY (INHALATION)
Status: DISCONTINUED | OUTPATIENT
Start: 2020-10-19 | End: 2020-10-19

## 2020-10-19 RX ADMIN — PROCHLORPERAZINE MALEATE 10 MG: 10 TABLET ORAL at 22:06

## 2020-10-19 RX ADMIN — FLUTICASONE PROPIONATE 1 SPRAY: 50 SPRAY, METERED NASAL at 22:07

## 2020-10-19 RX ADMIN — FLUTICASONE PROPIONATE 1 SPRAY: 50 SPRAY, METERED NASAL at 08:33

## 2020-10-19 RX ADMIN — HEPARIN SODIUM 5000 UNITS: 5000 INJECTION INTRAVENOUS; SUBCUTANEOUS at 13:41

## 2020-10-19 RX ADMIN — DIHYDROERGOTAMINE MESYLATE 1 MG: 1 INJECTION, SOLUTION INTRAMUSCULAR; INTRAVENOUS; SUBCUTANEOUS at 06:37

## 2020-10-19 RX ADMIN — DIVALPROEX SODIUM 500 MG: 500 TABLET, DELAYED RELEASE ORAL at 22:07

## 2020-10-19 RX ADMIN — Medication 1000 UNITS: at 17:24

## 2020-10-19 RX ADMIN — MAGNESIUM SULFATE IN WATER 2 G: 40 INJECTION, SOLUTION INTRAVENOUS at 08:33

## 2020-10-19 RX ADMIN — PROCHLORPERAZINE MALEATE 10 MG: 10 TABLET ORAL at 08:33

## 2020-10-19 RX ADMIN — SODIUM CHLORIDE 75 ML/HR: 0.9 INJECTION, SOLUTION INTRAVENOUS at 08:41

## 2020-10-19 RX ADMIN — DIHYDROERGOTAMINE MESYLATE 1 MG: 1 INJECTION, SOLUTION INTRAMUSCULAR; INTRAVENOUS; SUBCUTANEOUS at 22:07

## 2020-10-19 RX ADMIN — B-COMPLEX W/ C & FOLIC ACID TAB 1 TABLET: TAB at 08:32

## 2020-10-19 RX ADMIN — HEPARIN SODIUM 5000 UNITS: 5000 INJECTION INTRAVENOUS; SUBCUTANEOUS at 06:37

## 2020-10-19 RX ADMIN — MELATONIN 3 MG: at 22:07

## 2020-10-19 RX ADMIN — OXYCODONE HYDROCHLORIDE AND ACETAMINOPHEN 250 MG: 500 TABLET ORAL at 08:32

## 2020-10-19 RX ADMIN — DIHYDROERGOTAMINE MESYLATE 1 MG: 1 INJECTION, SOLUTION INTRAMUSCULAR; INTRAVENOUS; SUBCUTANEOUS at 13:41

## 2020-10-19 RX ADMIN — HEPARIN SODIUM 5000 UNITS: 5000 INJECTION INTRAVENOUS; SUBCUTANEOUS at 22:07

## 2020-10-19 RX ADMIN — SODIUM CHLORIDE 75 ML/HR: 0.9 INJECTION, SOLUTION INTRAVENOUS at 22:06

## 2020-10-19 RX ADMIN — PROCHLORPERAZINE MALEATE 10 MG: 10 TABLET ORAL at 17:23

## 2020-10-19 RX ADMIN — OXYCODONE HYDROCHLORIDE AND ACETAMINOPHEN 250 MG: 500 TABLET ORAL at 17:24

## 2020-10-19 RX ADMIN — HYDROCODONE BITARTRATE AND ACETAMINOPHEN 2 TABLET: 5; 325 TABLET ORAL at 22:06

## 2020-10-19 RX ADMIN — DIVALPROEX SODIUM 500 MG: 500 TABLET, DELAYED RELEASE ORAL at 10:20

## 2020-10-19 RX ADMIN — DIPHENHYDRAMINE HCL 25 MG: 25 TABLET ORAL at 22:06

## 2020-10-19 RX ADMIN — FOLIC ACID 1 MG: 1 TABLET ORAL at 08:32

## 2020-10-19 RX ADMIN — LORATADINE 10 MG: 10 TABLET ORAL at 08:33

## 2020-10-19 RX ADMIN — Medication 1000 UNITS: at 08:33

## 2020-10-19 RX ADMIN — AMITRIPTYLINE HYDROCHLORIDE 10 MG: 10 TABLET, FILM COATED ORAL at 22:07

## 2020-10-19 RX ADMIN — THIAMINE HCL TAB 100 MG 100 MG: 100 TAB at 08:32

## 2020-10-20 VITALS
HEART RATE: 72 BPM | RESPIRATION RATE: 18 BRPM | BODY MASS INDEX: 25.6 KG/M2 | DIASTOLIC BLOOD PRESSURE: 88 MMHG | TEMPERATURE: 97.9 F | WEIGHT: 178.79 LBS | HEIGHT: 70 IN | OXYGEN SATURATION: 92 % | SYSTOLIC BLOOD PRESSURE: 140 MMHG

## 2020-10-20 LAB
ATRIAL RATE: 63 BPM
P AXIS: 58 DEGREES
PR INTERVAL: 168 MS
QRS AXIS: 29 DEGREES
QRSD INTERVAL: 90 MS
QT INTERVAL: 408 MS
QTC INTERVAL: 417 MS
T WAVE AXIS: 55 DEGREES
VENTRICULAR RATE: 63 BPM

## 2020-10-20 PROCEDURE — 99239 HOSP IP/OBS DSCHRG MGMT >30: CPT | Performed by: INTERNAL MEDICINE

## 2020-10-20 PROCEDURE — 99232 SBSQ HOSP IP/OBS MODERATE 35: CPT | Performed by: PSYCHIATRY & NEUROLOGY

## 2020-10-20 PROCEDURE — 93005 ELECTROCARDIOGRAM TRACING: CPT

## 2020-10-20 PROCEDURE — 93010 ELECTROCARDIOGRAM REPORT: CPT | Performed by: INTERNAL MEDICINE

## 2020-10-20 RX ORDER — AMITRIPTYLINE HYDROCHLORIDE 10 MG/1
20 TABLET, FILM COATED ORAL
Qty: 60 TABLET | Refills: 0 | Status: SHIPPED | OUTPATIENT
Start: 2020-10-20

## 2020-10-20 RX ORDER — PREDNISONE 10 MG/1
TABLET ORAL
Qty: 45 TABLET | Refills: 0 | Status: SHIPPED | OUTPATIENT
Start: 2020-10-20 | End: 2021-03-12 | Stop reason: ALTCHOICE

## 2020-10-20 RX ORDER — LANOLIN ALCOHOL/MO/W.PET/CERES
100 CREAM (GRAM) TOPICAL DAILY
Qty: 15 TABLET | Refills: 0 | Status: SHIPPED | OUTPATIENT
Start: 2020-10-21

## 2020-10-20 RX ORDER — PROCHLORPERAZINE MALEATE 10 MG
10 TABLET ORAL EVERY 8 HOURS PRN
Qty: 30 TABLET | Refills: 0 | Status: SHIPPED | OUTPATIENT
Start: 2020-10-20

## 2020-10-20 RX ORDER — AMITRIPTYLINE HYDROCHLORIDE 10 MG/1
20 TABLET, FILM COATED ORAL
Status: DISCONTINUED | OUTPATIENT
Start: 2020-10-20 | End: 2020-10-20 | Stop reason: HOSPADM

## 2020-10-20 RX ADMIN — B-COMPLEX W/ C & FOLIC ACID TAB 1 TABLET: TAB at 09:07

## 2020-10-20 RX ADMIN — DIVALPROEX SODIUM 500 MG: 500 TABLET, DELAYED RELEASE ORAL at 09:07

## 2020-10-20 RX ADMIN — OXYCODONE HYDROCHLORIDE AND ACETAMINOPHEN 250 MG: 500 TABLET ORAL at 09:07

## 2020-10-20 RX ADMIN — HEPARIN SODIUM 5000 UNITS: 5000 INJECTION INTRAVENOUS; SUBCUTANEOUS at 05:17

## 2020-10-20 RX ADMIN — FOLIC ACID 1 MG: 1 TABLET ORAL at 09:07

## 2020-10-20 RX ADMIN — PROCHLORPERAZINE MALEATE 10 MG: 10 TABLET ORAL at 09:07

## 2020-10-20 RX ADMIN — Medication 1000 UNITS: at 09:07

## 2020-10-20 RX ADMIN — LORATADINE 10 MG: 10 TABLET ORAL at 09:07

## 2020-10-20 RX ADMIN — DIHYDROERGOTAMINE MESYLATE 1 MG: 1 INJECTION, SOLUTION INTRAMUSCULAR; INTRAVENOUS; SUBCUTANEOUS at 05:17

## 2020-10-20 RX ADMIN — PROCHLORPERAZINE MALEATE 10 MG: 10 TABLET ORAL at 16:27

## 2020-10-20 RX ADMIN — MAGNESIUM SULFATE IN WATER 2 G: 40 INJECTION, SOLUTION INTRAVENOUS at 09:09

## 2020-10-20 RX ADMIN — THIAMINE HCL TAB 100 MG 100 MG: 100 TAB at 09:07

## 2020-10-21 LAB — VIT B1 BLD-SCNC: 198.8 NMOL/L (ref 66.5–200)

## 2020-10-27 ENCOUNTER — TELEPHONE (OUTPATIENT)
Dept: NEUROLOGY | Facility: CLINIC | Age: 65
End: 2020-10-27

## 2020-10-30 ENCOUNTER — PROCEDURE VISIT (OUTPATIENT)
Dept: NEUROLOGY | Facility: CLINIC | Age: 65
End: 2020-10-30
Payer: COMMERCIAL

## 2020-10-30 VITALS — DIASTOLIC BLOOD PRESSURE: 88 MMHG | TEMPERATURE: 98.1 F | SYSTOLIC BLOOD PRESSURE: 159 MMHG | HEART RATE: 84 BPM

## 2020-10-30 DIAGNOSIS — G43.709 CHRONIC MIGRAINE WITHOUT AURA, NOT INTRACTABLE, WITHOUT STATUS MIGRAINOSUS: Primary | ICD-10-CM

## 2020-10-30 DIAGNOSIS — M54.2 CERVICALGIA: ICD-10-CM

## 2020-10-30 PROCEDURE — 64615 CHEMODENERV MUSC MIGRAINE: CPT | Performed by: PSYCHIATRY & NEUROLOGY

## 2020-12-29 ENCOUNTER — TELEPHONE (OUTPATIENT)
Dept: NEUROLOGY | Facility: CLINIC | Age: 65
End: 2020-12-29

## 2021-01-06 ENCOUNTER — TELEPHONE (OUTPATIENT)
Dept: NEUROLOGY | Facility: CLINIC | Age: 66
End: 2021-01-06

## 2021-01-06 NOTE — TELEPHONE ENCOUNTER
Type Date User Summary Attachment   General 01/06/2021  7:10 AM Reuben Bustos care coordination  -   Note    Botox- authorization #: [id number]- valid from 1/5/2021 until 1/5/2022   Please use [facility]      Thank you     [facility]

## 2021-01-06 NOTE — TELEPHONE ENCOUNTER
[facility]- spoke with Chun Porter- I advised her I was calling to initiate a refill for the patient's Botox prescription  Refill request initiated  Botox to be delivered on Thursday 1/28/2021 to the [facility] location via [address] [place] [address] delivery- a signature will be required  [place],    Please await the patient's Botox order and document once it has arrived  Please let me know if you do not receive the patient's Botox order      Thank you    Selena Garcia

## 2021-01-08 DIAGNOSIS — G43.709 CHRONIC MIGRAINE WITHOUT AURA, NOT INTRACTABLE, WITHOUT STATUS MIGRAINOSUS: Primary | ICD-10-CM

## 2021-01-08 RX ORDER — FENTANYL 25 UG/H
PATCH TRANSDERMAL
Qty: 10 PATCH | Refills: 0 | Status: SHIPPED | OUTPATIENT
Start: 2021-01-08 | End: 2021-01-11 | Stop reason: SDUPTHER

## 2021-01-11 ENCOUNTER — TELEPHONE (OUTPATIENT)
Dept: NEUROLOGY | Facility: CLINIC | Age: 66
End: 2021-01-11

## 2021-01-11 DIAGNOSIS — G43.709 CHRONIC MIGRAINE WITHOUT AURA, NOT INTRACTABLE, WITHOUT STATUS MIGRAINOSUS: ICD-10-CM

## 2021-01-11 RX ORDER — FENTANYL 25 UG/H
PATCH TRANSDERMAL
Qty: 10 PATCH | Refills: 0 | Status: SHIPPED | OUTPATIENT
Start: 2021-01-11

## 2021-01-11 NOTE — TELEPHONE ENCOUNTER
Isacc Gallardo calling in questioning Fentanyl patch  I did review below message with her  She reports that the pharmacy that was sent to is actually closed  They need script to be sent to [facility]  Please send ASAP thanks! "Prabhu Jane MD  [facility]             Please call patient and check up on him  Vincent Dec had message me last week stating that he was having a lot headaches   Want to go back on his fentanyl patch, which we had weaned him off of   It seems like the patch did not go through     Thank you "

## 2021-01-11 NOTE — TELEPHONE ENCOUNTER
Patient's wife calling in asking if medication had been sent  I made her aware that we spoke to [facility] at 408pm  Patient reported that he could not hear our office  She states she just got off the phone with pharmacy and they did not receive script  I called [facility] and she states script requires a PA        ID [id number]  BIN [id number]  Daphne Montoya [id number]  Phone# [phone]    Submitted on CMM  Key:[id number]

## 2021-01-12 NOTE — TELEPHONE ENCOUNTER
Medication has been denied  Denial in media  Medication was denied because it was not documented that pain was not from a migraine      Please advise

## 2021-01-12 NOTE — TELEPHONE ENCOUNTER
Patient's wife calling in to get status of below  She states that patient may have had aurine drug screen with PCP since he is on hydrocodone  She states that fentanyl patches is the only thing that helps with is migraines besides the migraines cocktail that he gets in the hospital     Please review denial letter and advise, thanks!       [phone]   Ok to leave a detailed message

## 2021-01-14 NOTE — TELEPHONE ENCOUNTER
Pt's wife Daniel Dawn calling to get status of PA  Advised of the all of the below  States that it began as a migraine but pt has other things going on  States that she will call pt's pcp if he is agreeable to prescribe fentanyl patch

## 2021-01-14 NOTE — TELEPHONE ENCOUNTER
Please let me know what I need to do with documentation  Patient is having chronic severe migraine  He was on fentanyl for 20 years for migraines and I tried to get him off of  It but was unsuccessful  If he does okay with fentanyl 25 mcg , I would have him continue taking that from his primary care physician  As I will not be  here to continue to give him the fentanyl      Thank you

## 2021-01-18 ENCOUNTER — TELEPHONE (OUTPATIENT)
Dept: NEUROLOGY | Facility: CLINIC | Age: 66
End: 2021-01-18

## 2021-01-18 NOTE — TELEPHONE ENCOUNTER
Per 1/11 encounter, patient is to get below medication from PCP    'I would have him continue taking that from his primary care physician    As I will not be  here to continue to give him the fentanyl   "

## 2021-01-18 NOTE — TELEPHONE ENCOUNTER
[facility] faxed a prior auth request for pt Fentanyl 25 mch/hr patch  For a 5 day temp supply submit a prior auth code [id number]  For a 15 day temporary supply submit prior auth code [id number]  Copy of request scanned into pt chart under media

## 2021-01-25 ENCOUNTER — TELEPHONE (OUTPATIENT)
Dept: NEUROLOGY | Facility: CLINIC | Age: 66
End: 2021-01-25

## 2021-01-25 NOTE — TELEPHONE ENCOUNTER
Pt called asking to speak with Dr Kristina Feng directly  Pt refused assistance from nursing team and declined to state reason for his call  He states that it is very important and he would appreciate a call at [phone]

## 2021-01-26 NOTE — TELEPHONE ENCOUNTER
Pt's wife called to confirm that we had correct phone number for pt    I again asked if I could get more info, she just states that he has a Serious situation and didn't want to give additional info   Please contact pt

## 2021-01-28 NOTE — TELEPHONE ENCOUNTER
Patient did speak with me about what he originally wanted when he called  Patient was upset about his fetanyl and states that based on a fax that was sent from our office, he was dismissed from his PCP's office  I advised that of Dr Tiara Truong response and that we would be better able to help him if he would let us know what he is calling for and we can provide the information to her so she can respond when she has the time available to do so  Patient states that he will be able to do so going forward  I did further advise that the original fetanyl script failed when it was faxed and advised that if it would be helpful, we can speak with the PCP office and provide them with the original fax with the date of 1/8/2021  He states he will call us back and let us know if it is needed

## 2021-01-28 NOTE — TELEPHONE ENCOUNTER
Botox number of units: 200 unit vial  Botox quantity: 1  Arrived at what location: [facility]  Lot [id number]  Expiration Date: 10/31/2023  Appt notes indicate correct medication: Yes    Botox logged and stored in the fridge

## 2021-01-29 ENCOUNTER — TELEPHONE (OUTPATIENT)
Dept: NEUROLOGY | Facility: CLINIC | Age: 66
End: 2021-01-29

## 2021-01-29 NOTE — TELEPHONE ENCOUNTER
Called pt to confirm upcoming apt in 2 weeks on 2/12/21 withDr Valente CHAUHANM on patients cell to call office    if any new/worsening symptoms to report? Asked pt if they are unable to keep apt or interested in virtual apt to please call office, also advised of no show fee  Advised we will call closer to day of apt for COVID screening

## 2021-02-11 ENCOUNTER — TELEPHONE (OUTPATIENT)
Dept: NEUROLOGY | Facility: CLINIC | Age: 66
End: 2021-02-11

## 2021-02-11 NOTE — TELEPHONE ENCOUNTER
Called patient and I left a message to call back to reschedule Botox  I currently have a spot on hold next Tuesday 02/16/21/ if the patient calls back please offer that slot

## 2021-02-11 NOTE — TELEPHONE ENCOUNTER
Patient called to cancel his botox appt for 2/12/21 he is unable to keep due to a conflict with other appt     Tried to find an open appt to reschedule, Dr Humaira Kelley has nothing available however there are some spots on hold     Can you please try to reschedule the patient for his botox    Thank you

## 2021-03-11 ENCOUNTER — TELEPHONE (OUTPATIENT)
Dept: NEUROLOGY | Facility: CLINIC | Age: 66
End: 2021-03-11

## 2021-03-11 NOTE — TELEPHONE ENCOUNTER
Called patient and I left a message offering a sooner appointment with Danita Capone today 03/11/21 at 03:30pm  I did let him know that I will be calling others as well, but if he calls back prior to the spot being filled we can bring him in sooner

## 2021-03-12 ENCOUNTER — PROCEDURE VISIT (OUTPATIENT)
Dept: NEUROLOGY | Facility: CLINIC | Age: 66
End: 2021-03-12
Payer: COMMERCIAL

## 2021-03-12 VITALS — SYSTOLIC BLOOD PRESSURE: 166 MMHG | DIASTOLIC BLOOD PRESSURE: 83 MMHG | TEMPERATURE: 97.9 F | HEART RATE: 92 BPM

## 2021-03-12 DIAGNOSIS — G43.709 CHRONIC MIGRAINE WITHOUT AURA, NOT INTRACTABLE, WITHOUT STATUS MIGRAINOSUS: Primary | ICD-10-CM

## 2021-03-12 PROCEDURE — 64615 CHEMODENERV MUSC MIGRAINE: CPT | Performed by: PSYCHIATRY & NEUROLOGY

## 2021-03-12 RX ORDER — PSEUDOEPHEDRINE HCL 120 MG
400 TABLET, EXTENDED RELEASE ORAL DAILY
COMMUNITY

## 2021-03-12 NOTE — PROGRESS NOTES
Universal Protocol   Consent: Verbal consent obtained  Written consent obtained  Risks and benefits: risks, benefits and alternatives were discussed  Consent given by: patient  Time out: Immediately prior to procedure a "time out" was called to verify the correct patient, procedure, equipment, support staff and site/side marked as required  Patient understanding: patient states understanding of the procedure being performed  Patient consent: the patient's understanding of the procedure matches consent given  Procedure consent: procedure consent matches procedure scheduled  Relevant documents: relevant documents present and verified  Patient identity confirmed: verbally with patient        Chemodenervation     Date/Time 3/12/2021 1:55 PM     Performed by  Carleen Cleary MD     Authorized by Carleen Cleary MD        Pre-procedure details      Prepped With: Alcohol     Anesthesia  (see MAR for exact dosages): Anesthesia method:  None   Procedure details     Position:  Upright   Botox     Botox Type:  Type A    Brand:  Botox    mL's of Botulinum Toxin:  200    Final Concentration per CC:  50 units    Needle Gauge:  30 G 2 5 inch   Total Units     Total units used:  200    Total units discarded:  0   Post-procedure details      Chemodenervation:  Chronic migraine    Facial Nerve Location[de-identified]  Bilateral facial nerve    Patient tolerance of procedure:   Tolerated well, no immediate complications          Procedures:     Botox Procedures: chronic headache      Indications: migraines         L  injection amount:  5 unit(s)    R  injection amount:  5 unit(s)    Procerus injection amount:  5 unit(s)       L lateral frontalis:  5 unit(s)    R lateral frontalis:  5 unit(s)    L medial frontalis:  5 unit(s)    R medial frontalis:  5 unit(s)       L temporalis injection amount:  20 unit(s)    R temporalis injection amount:  20 unit(s)       L medial occipitalis injection amount:  15 unit(s)    R medial occipitalis injection amount:  15 unit(s)       L superior cervical paraspinal injection amount:  10 unit(s)    R superior cervical paraspinal injection amount:  10 unit(s)       L superior trapezius injection amount:  15 unit(s)    R superior trapezius injection amount:  15 unit(s)     45 units biparietal and bioccipital , which was medically necessary      Total Units:     Total units used:  200    Total units discarded:  0

## 2021-04-22 ENCOUNTER — TELEPHONE (OUTPATIENT)
Dept: NEUROLOGY | Facility: CLINIC | Age: 66
End: 2021-04-22

## 2021-04-22 NOTE — TELEPHONE ENCOUNTER
Received a voicemail from [employer]- they are calling to schedule the delivery for the patient's Botox order  Per appointment desktop patient does not have any current appointments scheduled with our office  Yehuda Bob,    Please reach out to the patient and see if he will be continuing with Botox injections with our office  Patient was previously getting injections with Dr Tai Rey- last injection: 3/12/21  Once the patient is scheduled please let me know I will attach a referral and call and schedule delivery      Thank you,    Katie Robert

## 2021-04-27 NOTE — TELEPHONE ENCOUNTER
Called patient and I left a message to call back to see if he wanted to continue to receive Botox injections

## 2021-05-27 NOTE — TELEPHONE ENCOUNTER
Received a voicemail from [employer], calling to schedule delivery of patient's botox medication  [place],   Please follow up with patient to see if he will be continuing injections with our office      Thanks  Diana Hadley

## 2021-07-13 NOTE — TELEPHONE ENCOUNTER
Called patient and I left a message to call back to clarify that he no longer wants to receive Botox with out office

## 2021-07-28 NOTE — TELEPHONE ENCOUNTER
Letter was sent over to the patient to call back to schedule and appointment for Botox if he wishes to continue care with us